# Patient Record
Sex: FEMALE | Race: WHITE | NOT HISPANIC OR LATINO
[De-identification: names, ages, dates, MRNs, and addresses within clinical notes are randomized per-mention and may not be internally consistent; named-entity substitution may affect disease eponyms.]

---

## 2018-05-06 ENCOUNTER — TRANSCRIPTION ENCOUNTER (OUTPATIENT)
Age: 48
End: 2018-05-06

## 2018-05-25 ENCOUNTER — TRANSCRIPTION ENCOUNTER (OUTPATIENT)
Age: 48
End: 2018-05-25

## 2019-02-20 ENCOUNTER — TRANSCRIPTION ENCOUNTER (OUTPATIENT)
Age: 49
End: 2019-02-20

## 2021-05-01 ENCOUNTER — TRANSCRIPTION ENCOUNTER (OUTPATIENT)
Age: 51
End: 2021-05-01

## 2021-07-30 ENCOUNTER — TRANSCRIPTION ENCOUNTER (OUTPATIENT)
Age: 51
End: 2021-07-30

## 2022-12-28 ENCOUNTER — OFFICE (OUTPATIENT)
Dept: URBAN - METROPOLITAN AREA CLINIC 35 | Facility: CLINIC | Age: 52
Setting detail: OPHTHALMOLOGY
End: 2022-12-28
Payer: MEDICAID

## 2022-12-28 DIAGNOSIS — G43.009: ICD-10-CM

## 2022-12-28 DIAGNOSIS — H35.372: ICD-10-CM

## 2022-12-28 DIAGNOSIS — F34.1: ICD-10-CM

## 2022-12-28 DIAGNOSIS — H40.013: ICD-10-CM

## 2022-12-28 DIAGNOSIS — H53.122: ICD-10-CM

## 2022-12-28 PROBLEM — H01.001 BLEPHARITIS; RIGHT UPPER LID, LEFT UPPER LID: Status: ACTIVE | Noted: 2022-12-28

## 2022-12-28 PROBLEM — H01.004 BLEPHARITIS; RIGHT UPPER LID, LEFT UPPER LID: Status: ACTIVE | Noted: 2022-12-28

## 2022-12-28 PROBLEM — H25.13 CATARACT SENILE NUCLEAR SCLEROSIS; BOTH EYES: Status: ACTIVE | Noted: 2022-12-28

## 2022-12-28 PROCEDURE — 76514 ECHO EXAM OF EYE THICKNESS: CPT | Performed by: OPHTHALMOLOGY

## 2022-12-28 PROCEDURE — 92004 COMPRE OPH EXAM NEW PT 1/>: CPT | Performed by: OPHTHALMOLOGY

## 2022-12-28 PROCEDURE — 92083 EXTENDED VISUAL FIELD XM: CPT | Performed by: OPHTHALMOLOGY

## 2022-12-28 PROCEDURE — 92250 FUNDUS PHOTOGRAPHY W/I&R: CPT | Performed by: OPHTHALMOLOGY

## 2022-12-28 PROCEDURE — 92134 CPTRZ OPH DX IMG PST SGM RTA: CPT | Performed by: OPHTHALMOLOGY

## 2022-12-28 ASSESSMENT — REFRACTION_AUTOREFRACTION
OD_CYLINDER: -0.50
OS_CYLINDER: -0.75
OD_SPHERE: -5.00
OS_SPHERE: -5.50
OD_AXIS: 108
OS_AXIS: 083

## 2022-12-28 ASSESSMENT — REFRACTION_CURRENTRX
OS_VPRISM_DIRECTION: BF
OD_ADD: +1.50
OS_OVR_VA: 20/
OD_SPHERE: -5.50
OD_AXIS: 011
OD_VPRISM_DIRECTION: BF
OS_CYLINDER: +0.50
OS_AXIS: 160
OD_CYLINDER: +0.50
OS_ADD: +1.50
OD_OVR_VA: 20/
OS_SPHERE: -5.25

## 2022-12-28 ASSESSMENT — SPHEQUIV_DERIVED
OD_SPHEQUIV: -5.25
OS_SPHEQUIV: -5.875

## 2022-12-28 ASSESSMENT — VISUAL ACUITY
OS_BCVA: 20/25-3
OD_BCVA: 20/25-2

## 2022-12-28 ASSESSMENT — CONFRONTATIONAL VISUAL FIELD TEST (CVF)
OS_FINDINGS: FULL
OD_FINDINGS: FULL

## 2022-12-28 ASSESSMENT — PACHYMETRY
OD_CT_CORRECTION: -4
OS_CT_CORRECTION: -6
OD_CT_UM: 604
OS_CT_UM: 622

## 2022-12-28 ASSESSMENT — LID EXAM ASSESSMENTS
OD_BLEPHARITIS: RUL
OS_BLEPHARITIS: LUL

## 2023-01-25 PROBLEM — Z00.00 ENCOUNTER FOR PREVENTIVE HEALTH EXAMINATION: Status: ACTIVE | Noted: 2023-01-25

## 2023-02-01 ENCOUNTER — NON-APPOINTMENT (OUTPATIENT)
Age: 53
End: 2023-02-01

## 2023-02-01 ENCOUNTER — APPOINTMENT (OUTPATIENT)
Dept: OPHTHALMOLOGY | Facility: CLINIC | Age: 53
End: 2023-02-01
Payer: MEDICAID

## 2023-02-01 PROCEDURE — 92083 EXTENDED VISUAL FIELD XM: CPT

## 2023-02-01 PROCEDURE — 99204 OFFICE O/P NEW MOD 45 MIN: CPT

## 2023-02-07 ENCOUNTER — APPOINTMENT (OUTPATIENT)
Dept: NEUROLOGY | Facility: CLINIC | Age: 53
End: 2023-02-07

## 2023-03-15 ENCOUNTER — APPOINTMENT (OUTPATIENT)
Dept: NEUROLOGY | Facility: CLINIC | Age: 53
End: 2023-03-15
Payer: MEDICAID

## 2023-03-15 VITALS
HEIGHT: 63 IN | OXYGEN SATURATION: 99 % | WEIGHT: 122 LBS | HEART RATE: 80 BPM | DIASTOLIC BLOOD PRESSURE: 80 MMHG | BODY MASS INDEX: 21.62 KG/M2 | SYSTOLIC BLOOD PRESSURE: 124 MMHG

## 2023-03-15 DIAGNOSIS — Z83.518 FAMILY HISTORY OF OTHER SPECIFIED EYE DISORDER: ICD-10-CM

## 2023-03-15 DIAGNOSIS — F31.9 BIPOLAR DISORDER, UNSPECIFIED: ICD-10-CM

## 2023-03-15 DIAGNOSIS — Z82.49 FAMILY HISTORY OF ISCHEMIC HEART DISEASE AND OTHER DISEASES OF THE CIRCULATORY SYSTEM: ICD-10-CM

## 2023-03-15 DIAGNOSIS — Z87.891 PERSONAL HISTORY OF NICOTINE DEPENDENCE: ICD-10-CM

## 2023-03-15 DIAGNOSIS — E03.9 HYPOTHYROIDISM, UNSPECIFIED: ICD-10-CM

## 2023-03-15 PROCEDURE — 99205 OFFICE O/P NEW HI 60 MIN: CPT

## 2023-03-15 RX ORDER — BUPROPION HYDROCHLORIDE 75 MG/1
TABLET, FILM COATED ORAL
Refills: 0 | Status: ACTIVE | COMMUNITY

## 2023-03-15 RX ORDER — DIAZEPAM 5 MG/1
TABLET ORAL
Refills: 0 | Status: ACTIVE | COMMUNITY

## 2023-03-15 RX ORDER — LITHIUM CARBONATE 300 MG/1
TABLET ORAL
Refills: 0 | Status: ACTIVE | COMMUNITY

## 2023-03-15 RX ORDER — LAMOTRIGINE 150 MG/1
TABLET ORAL
Refills: 0 | Status: ACTIVE | COMMUNITY

## 2023-03-15 RX ORDER — LEVOTHYROXINE SODIUM 0.17 MG/1
TABLET ORAL
Refills: 0 | Status: ACTIVE | COMMUNITY

## 2023-03-24 LAB
25(OH)D3 SERPL-MCNC: 55.6 NG/ML
CRP SERPL-MCNC: <3 MG/L
ERYTHROCYTE [SEDIMENTATION RATE] IN BLOOD BY WESTERGREN METHOD: 2 MM/HR
RHEUMATOID FACT SER QL: <10 IU/ML
TSH SERPL-ACNC: 2.22 UIU/ML
VIT B12 SERPL-MCNC: >2000 PG/ML

## 2023-03-27 LAB
ACE BLD-CCNC: 18 U/L
ANA SER IF-ACNC: NEGATIVE
B BURGDOR AB SER-IMP: NEGATIVE
B BURGDOR IGG+IGM SER QL: 0.12 INDEX
CARDIOLIPIN IGM SER-MCNC: 9 MPL
CARDIOLIPIN IGM SER-MCNC: <5 GPL
DSDNA AB SER-ACNC: 12 IU/ML
ENA RNP AB SER IA-ACNC: <0.2 AL
ENA SM AB SER IA-ACNC: <0.2 AL
ENA SS-A AB SER IA-ACNC: <0.2 AL
ENA SS-B AB SER IA-ACNC: <0.2 AL
T PALLIDUM AB SER QL IA: NEGATIVE

## 2023-03-28 ENCOUNTER — OUTPATIENT (OUTPATIENT)
Dept: OUTPATIENT SERVICES | Facility: HOSPITAL | Age: 53
LOS: 1 days | End: 2023-03-28
Payer: MEDICAID

## 2023-03-28 ENCOUNTER — APPOINTMENT (OUTPATIENT)
Dept: RADIOLOGY | Facility: HOSPITAL | Age: 53
End: 2023-03-28

## 2023-03-28 ENCOUNTER — RESULT REVIEW (OUTPATIENT)
Age: 53
End: 2023-03-28

## 2023-03-28 DIAGNOSIS — R90.89 OTHER ABNORMAL FINDINGS ON DIAGNOSTIC IMAGING OF CENTRAL NERVOUS SYSTEM: ICD-10-CM

## 2023-03-28 LAB
APPEARANCE CSF: CLEAR — SIGNIFICANT CHANGE UP
B2 GLYCOPROT1 IGG SER-ACNC: <5 SGU
B2 GLYCOPROT1 IGM SER-ACNC: 7.3 SMU
COLOR CSF: SIGNIFICANT CHANGE UP
GLUCOSE CSF-MCNC: 60 MG/DL — SIGNIFICANT CHANGE UP (ref 40–70)
GRAM STN FLD: SIGNIFICANT CHANGE UP
LDH CSF L TO P-CCNC: 18 U/L — SIGNIFICANT CHANGE UP
LDH FLD-CCNC: 18 U/L — SIGNIFICANT CHANGE UP
LYMPHOCYTES # CSF: 92 % — HIGH (ref 40–80)
MONOS+MACROS NFR CSF: 8 % — LOW (ref 15–45)
NEUTROPHILS # CSF: SIGNIFICANT CHANGE UP (ref 0–6)
NRBC NFR CSF: 2 /UL — SIGNIFICANT CHANGE UP (ref 0–5)
PROT CSF-MCNC: 38 MG/DL — SIGNIFICANT CHANGE UP (ref 15–45)
RBC # CSF: 0 /UL — SIGNIFICANT CHANGE UP (ref 0–0)
SPECIMEN SOURCE: SIGNIFICANT CHANGE UP
TUBE TYPE: SIGNIFICANT CHANGE UP

## 2023-03-28 PROCEDURE — 88108 CYTOPATH CONCENTRATE TECH: CPT | Mod: 26

## 2023-03-28 PROCEDURE — 83873 ASSAY OF CSF PROTEIN: CPT

## 2023-03-28 PROCEDURE — 88189 FLOWCYTOMETRY/READ 16 & >: CPT

## 2023-03-28 PROCEDURE — 82042 OTHER SOURCE ALBUMIN QUAN EA: CPT

## 2023-03-28 PROCEDURE — 87070 CULTURE OTHR SPECIMN AEROBIC: CPT

## 2023-03-28 PROCEDURE — 86789 WEST NILE VIRUS ANTIBODY: CPT

## 2023-03-28 PROCEDURE — 86788 WEST NILE VIRUS AB IGM: CPT

## 2023-03-28 PROCEDURE — 82784 ASSAY IGA/IGD/IGG/IGM EACH: CPT

## 2023-03-28 PROCEDURE — 88185 FLOWCYTOMETRY/TC ADD-ON: CPT

## 2023-03-28 PROCEDURE — 84157 ASSAY OF PROTEIN OTHER: CPT

## 2023-03-28 PROCEDURE — 86592 SYPHILIS TEST NON-TREP QUAL: CPT

## 2023-03-28 PROCEDURE — 62328 DX LMBR SPI PNXR W/FLUOR/CT: CPT

## 2023-03-28 PROCEDURE — 88108 CYTOPATH CONCENTRATE TECH: CPT

## 2023-03-28 PROCEDURE — 83615 LACTATE (LD) (LDH) ENZYME: CPT

## 2023-03-28 PROCEDURE — 82945 GLUCOSE OTHER FLUID: CPT

## 2023-03-28 PROCEDURE — 86617 LYME DISEASE ANTIBODY: CPT

## 2023-03-28 PROCEDURE — 89051 BODY FLUID CELL COUNT: CPT

## 2023-03-28 PROCEDURE — 83916 OLIGOCLONAL BANDS: CPT

## 2023-03-28 PROCEDURE — 87205 SMEAR GRAM STAIN: CPT

## 2023-03-28 PROCEDURE — 82164 ANGIOTENSIN I ENZYME TEST: CPT

## 2023-03-28 PROCEDURE — 82040 ASSAY OF SERUM ALBUMIN: CPT

## 2023-03-29 LAB
ALBUMIN CSF-MCNC: 24.9 MG/DL — SIGNIFICANT CHANGE UP (ref 14–25)
ALBUMIN SERPL ELPH-MCNC: 3783 MG/DL — SIGNIFICANT CHANGE UP (ref 3500–5200)
IGG CSF-MCNC: 5.7 MG/DL — HIGH
IGG CSF-MCNC: 5.7 MG/DL — HIGH
IGG FLD-MCNC: 1045 MG/DL — SIGNIFICANT CHANGE UP (ref 610–1660)
IGG SYNTH RATE SER+CSF CALC-MRATE: 9.3 MG/DAY — HIGH
IGG/ALB CLEAR SER+CSF-RTO: 0.8 — HIGH
IGG/ALB CSF: 0.23 RATIO — SIGNIFICANT CHANGE UP
IGG/ALB SER: 0.28 RATIO — SIGNIFICANT CHANGE UP

## 2023-03-30 LAB
AQP4 H2O CHANNEL AB SERPL IA-ACNC: NEGATIVE
HTLV I+II AB SER QL: NORMAL
MOG AB SER QL CBA IFA: NEGATIVE
NON-GYNECOLOGICAL CYTOLOGY STUDY: SIGNIFICANT CHANGE UP
TM INTERPRETATION: SIGNIFICANT CHANGE UP
VDRL CSF-TITR: SIGNIFICANT CHANGE UP

## 2023-03-30 NOTE — HISTORY OF PRESENT ILLNESS
[FreeTextEntry1] : HPI (initial visit Mar 15, 2023)- SAMUEL LIM is a 52 year old woman w/ hx of bipolar disorder, depression, hypothyroidism, fibromyalgia, migraine headaches, referred by neurologist, Dr. Melvin Guo, to rule out MS. \par \par Of note, she was seen by Dr. Corbett (2/1/2023) for episodic vision loss OS. First episode was 5/2022, lost complete vision in left eye, "like a curtain", it lasted seconds but vision still remained blurry (both eyes) for another 30 min and also complained of pain with eye movements of left eye. Saw an Optometrist, thought it was related to contact lenses, given new contacts. Occurs 2-3 times/month (duration of episodes varies). No clear triggers. WIth last 2 episodes had double vision, never covered on eye to see if it improved. Neuro ophtho exam was normal and Dr. Corbett suspect aura migraines, given childhood hx of migraines. Recommended neurology evaluation. Has had carotid duplex which was reportedly normal.\par \par She keeps a note of all her symptoms. She has had multiple symptoms over the years. "I have had things over my life that have never been put together". "Extreme fatigue". "severe memory fog". Word finding difficulty- ~ 2020. Chest tightness. Chronic bowel issues- constipation, seeing GI recently and had colonoscopy, on MiraLAX, it is working. Mother has always told her she walks funny and slurs her words.  notices her balance is off, improving with PT. Looses her balance if she squats, this started about 7 years ago. She was diagnosed with fibromyalgia, knee pains/shoulder pains/ankle pain, seen rheumatologist, Isolated AMARI elevation, "flare ups now and then". Several incidents of vertigo, lasting 3-5 min, now resolved. Stiffness in neck/shoulders and other joints. She gets tension headaches. Urinary urgency and hesitancy. \par \par Recently seen by Dr Melvin Guo (neurologist), had MRI brain and spine at [facility] 3T MRI (pt brings reports and disc). I personally reviewed images. \par MRI brain w/o con 213/2023- scattered supratentorial WM lesions, more in R cerebral hemisphere, some punctuate and others ovoid appearing and periventricular (abutting Lat ventricle). No definite CC or infratentorial lesions. no enhancing lesions. The larger WM lesions have corresponding T1 black holes on T1 sequence. \par MRI brain and orbits w/w/o 3/2/2023- Stable brain MRI. Normal orbits. \par MRI C w/w/o 2/23/2023- no cervical cord lesion. Mild posterior disc bulging at several levels with mild thecal sac compression and b/l NF narrowing at C4-C5.\par MRI T spine w/w/o 2/24/2023- abnormal signal at anterior left T2 cord without enh. Cord atrophy in this region. \par \par She got  in July 2022. Diagnosed with Bipolar disorder 12 years ago (sees a psychopharmacologist), reports mood is well controlled. She was a  for 27 years, stopped working in 2021 due to brain fog.\par \par

## 2023-03-30 NOTE — DATA REVIEWED
[de-identified] : Recently seen by Dr Melvin Guo (neurologist), had MRI brain and spine at [facility] 3T MRI (pt brings reports and disc). I personally reviewed images. \par MRI brain w/o con 213/2023- scattered supratentorial WM lesions, more in R cerebral hemisphere, some punctuate and others ovoid appearing and periventricular (abutting Lat ventricle). No definite CC or infratentorial lesions. no enhancing lesions. The larger WM lesions have corresponding T1 black holes on T1 sequence. \par MRI brain and orbits w/w/o 3/2/2023- Stable brain MRI. Normal orbits. \par MRI C w/w/o 2/23/2023- no cervical cord lesion. Mild posterior disc bulging at several levels with mild thecal sac compression and b/l NF narrowing at C4-C5.\par MRI T spine w/w/o 2/24/2023- abnormal signal at anterior left T2 cord without enh. Cord atrophy in this region.

## 2023-03-30 NOTE — ASSESSMENT
[FreeTextEntry1] : Assessment/Plan:\par  52 year old female referred by Dr Melvin Guo for consultation on possible MS. She has had a long standing hx of non specific symptoms, including imbalance, slurred speech, vertigo, brain fog with word finding difficulty, generalized joint pains and extreme fatigue, which have all lead her to quit her job in 2021. Since May 2022, she has been experiencing intermittent transient monocular/binocular visual disturbances (vision loss/blurry vision/diplopia) with normal neuro ophthalmological exam by Dr Corbett 2/2023. She recently had MRI imaging of brain and spine which showed lesions on brain MRI and T spine MRI concerning for possible demyelinating disease.\par \par Though her MRI scans are concerning for a demyelinating process and her neurological exam is also significant for upper motor neuron signs, her clinical history is non specific and not strongly suggestive of MS. She does warrant further work up to identify cause of brain and cord lesions. \par \par Plan:-\par [] Will order MS mimickers in serum\par [] Recommend spinal tap to look for oligoclonal bands\par Discussed the procedure and side effects with patients; including infection, bleeding, nerve injury and post spinal headache.\par [] Will uploaded MRI imaging for further review\par \par \par Return to clinic 1 month\par \par The above plan was discussed with SAMUEL LIM in great detail.  SAMUEL LIM verbalized understanding and agrees with plan as detailed above. Patient was provided education and counselling on current diagnosis/symptoms. She was advised to call our clinic at [phone] for any new or worsening symptoms, or with any questions or concerns. In case of acute onset of neurological symptoms or worsening presentation, patient was advised to present to nearest emergency room for further evaluation. SAMUEL LIM expressed understanding and all her questions/concerns were addressed.\par \par Lashonda Alvarado M.D\par

## 2023-03-30 NOTE — PHYSICAL EXAM
[FreeTextEntry1] : PHYSICAL EXAM\par Constitutional: Alert, no acute distress \par Psychiatric: appropriate affect and mood\par Pulmonary: No respiratory distress, stable on room air\par \par NEUROLOGICAL EXAM\par Mental status: The patient is alert, attentive and oriented x 4\par Speech/language: No dysarthria. Slow speech. Pauses. Slowed processing speed when comprehending commands. \par Cranial nerves:\par CN II: Visual fields are full to confrontation. Pupil size equal and briskly reactive to light. No RAPD\par CN III, IV, VI: EOMI, b/l end gaze nystagmus, no ptosis\par CN V: Reduced sensations to PP over right half of face with midline splitting\par CN VII: Face is symmetric with normal eye closure and smile.\par CN VII: Hearing is normal to rubbing fingers\par CN IX, X: Palate elevates symmetrically. Phonation is normal.\par CN XI: Head turning and shoulder shrug are intact\par CN XII: Tongue is midline with normal movements and no atrophy.\par Motor: Strength is full bilaterally. 5/5 muscle power in bilateral UE and LE. Some give way weakness. Muscle tone increased in LLE.\par Reflexes:  Diffusely hyperreflexic, b/l yeboah, b/l clonus (nonsustained on L and sustained on R). No Babinski\par Sensory: Reduced sensation to PP over RUE and LLE (midline splitting over chest). Vibration sensation stronger over RUE (25 s) and RLE (10 s) compared to LUE (21s) and LLE (8s) respectively. \par Coordination: Tremors of hands- distractible. No dysmetria on FNF\par Gait/Stance: Narrow based spastic gait. Cannot tandem gait. Romberg positive\par \par \par \par \par

## 2023-03-31 LAB
INNER EAR 68KD AB FLD QL: <1.5 U/L — SIGNIFICANT CHANGE UP (ref 0–2.5)
WNV IGG CSF IA-ACNC: NEGATIVE — SIGNIFICANT CHANGE UP
WNV IGM CSF IA-ACNC: NEGATIVE — SIGNIFICANT CHANGE UP

## 2023-04-01 LAB
CULTURE RESULTS: NO GROWTH — SIGNIFICANT CHANGE UP
SPECIMEN SOURCE: SIGNIFICANT CHANGE UP

## 2023-04-02 LAB — MBP CSF-MCNC: 5.2 NG/ML — HIGH (ref 0–3.7)

## 2023-04-06 LAB — OLIGOCLONAL BANDS CSF ELPH-IMP: SIGNIFICANT CHANGE UP

## 2023-04-10 LAB — B BURGDOR AB CSF-ACNC: SIGNIFICANT CHANGE UP

## 2023-04-12 ENCOUNTER — APPOINTMENT (OUTPATIENT)
Dept: NEUROLOGY | Facility: CLINIC | Age: 53
End: 2023-04-12
Payer: MEDICAID

## 2023-04-12 VITALS
WEIGHT: 136 LBS | OXYGEN SATURATION: 100 % | RESPIRATION RATE: 15 BRPM | DIASTOLIC BLOOD PRESSURE: 69 MMHG | HEART RATE: 74 BPM | SYSTOLIC BLOOD PRESSURE: 109 MMHG | BODY MASS INDEX: 24.1 KG/M2 | TEMPERATURE: 98 F | HEIGHT: 63 IN

## 2023-04-12 DIAGNOSIS — H53.9 UNSPECIFIED VISUAL DISTURBANCE: ICD-10-CM

## 2023-04-12 DIAGNOSIS — R90.89 OTHER ABNORMAL FINDINGS ON DIAGNOSTIC IMAGING OF CENTRAL NERVOUS SYSTEM: ICD-10-CM

## 2023-04-12 DIAGNOSIS — R93.7 ABNORMAL FINDINGS ON DIAGNOSTIC IMAGING OF OTHER PARTS OF MUSCULOSKELETAL SYSTEM: ICD-10-CM

## 2023-04-12 PROCEDURE — 99214 OFFICE O/P EST MOD 30 MIN: CPT

## 2023-04-13 LAB
ALBUMIN SERPL ELPH-MCNC: 4.9 G/DL
ALP BLD-CCNC: 113 U/L
ALT SERPL-CCNC: 22 U/L
AST SERPL-CCNC: 16 U/L
BASOPHILS # BLD AUTO: 0.06 K/UL
BASOPHILS NFR BLD AUTO: 0.8 %
BILIRUB DIRECT SERPL-MCNC: 0.1 MG/DL
BILIRUB INDIRECT SERPL-MCNC: 0.2 MG/DL
BILIRUB SERPL-MCNC: 0.4 MG/DL
BUN SERPL-MCNC: 13 MG/DL
CREAT SERPL-MCNC: 0.93 MG/DL
DEPRECATED KAPPA LC FREE/LAMBDA SER: 1.29 RATIO
EGFR: 75 ML/MIN/1.73M2
EOSINOPHIL # BLD AUTO: 0.41 K/UL
EOSINOPHIL NFR BLD AUTO: 5.6 %
HBV CORE IGG+IGM SER QL: NONREACTIVE
HBV CORE IGM SER QL: NONREACTIVE
HBV SURFACE AB SER QL: NONREACTIVE
HBV SURFACE AG SER QL: NONREACTIVE
HCT VFR BLD CALC: 36.2 %
HGB BLD-MCNC: 11.3 G/DL
IGA SER QL IEP: 145 MG/DL
IGG SER QL IEP: 1086 MG/DL
IGM SER QL IEP: 168 MG/DL
IMM GRANULOCYTES NFR BLD AUTO: 0.3 %
KAPPA LC CSF-MCNC: 2.48 MG/DL
KAPPA LC SERPL-MCNC: 3.2 MG/DL
LYMPHOCYTES # BLD AUTO: 2.27 K/UL
LYMPHOCYTES NFR BLD AUTO: 31.2 %
MAN DIFF?: NORMAL
MCHC RBC-ENTMCNC: 31.2 GM/DL
MCHC RBC-ENTMCNC: 31.2 PG
MCV RBC AUTO: 100 FL
MONOCYTES # BLD AUTO: 0.42 K/UL
MONOCYTES NFR BLD AUTO: 5.8 %
NEUTROPHILS # BLD AUTO: 4.1 K/UL
NEUTROPHILS NFR BLD AUTO: 56.3 %
PLATELET # BLD AUTO: 318 K/UL
PROT SERPL-MCNC: 6.9 G/DL
RBC # BLD: 3.62 M/UL
RBC # FLD: 13.5 %
WBC # FLD AUTO: 7.28 K/UL

## 2023-04-14 NOTE — DATA REVIEWED
[de-identified] : Recently seen by Dr Melvin Guo (neurologist), had MRI brain and spine at [facility] 3T MRI (pt brings reports and disc). I personally reviewed images. \par MRI brain w/o con 213/2023- scattered supratentorial WM lesions, more in R cerebral hemisphere, some punctuate and others ovoid appearing and periventricular (abutting Lat ventricle). No definite CC or infratentorial lesions. no enhancing lesions. The larger WM lesions have corresponding T1 black holes on T1 sequence. \par MRI brain and orbits w/w/o 3/2/2023- Stable brain MRI. Normal orbits. \par MRI C w/w/o 2/23/2023- no cervical cord lesion. Mild posterior disc bulging at several levels with mild thecal sac compression and b/l NF narrowing at C4-C5.\par MRI T spine w/w/o 2/24/2023- abnormal signal at anterior left T2 cord without enh. Cord atrophy in this region.  [de-identified] : Spinal tap 3/28- Uinque and matched OCB (> 5 unique), 2 TNC, Lyme neg, ACE neg, VDRL neg, WNV neg. P38, G60. IgG index 0.8, MBP 5.2. \par MS mimicker labs in serum, including NMO and MOG ab negative. \par Vitamin D 55.6. B12 > 2000.

## 2023-04-14 NOTE — HISTORY OF PRESENT ILLNESS
[FreeTextEntry1] : INTERIM HX 04/12/2023: \par Spinal tap 3/28- Uinque and matched OCB (> 5 unique), 2 TNC, Lyme neg, ACE neg, VDRL neg, WNV neg. P38, G60. IgG index 0.8, MBP 5.2. \par MS mimicker labs in serum, including NMO and MOG ab negative. \par Vitamin D 55.6. B12 > 2000.\par \par Had post spinal headaches x 3 days, self resolved. \par no more visual episodes/\par Extreme fatigue.\par Tension headaches 3-4x/month, does not recall last migraine HA.\par Financial stress and worry.\par Tremors of hands. \par Also lived a very busy life, may have brushed off symptoms. When she first started her job after college, she would notice RLE numbness, attributed this to sitting long periods of time. \par Cannot remember things, word finding.\par A Pediatrician had recommended she be watched for "MS".\par ----------------------------------\par HPI (initial visit Mar 15, 2023)- SAMUEL LIM is a 52 year old woman w/ hx of bipolar disorder, depression, hypothyroidism, fibromyalgia, migraine headaches, referred by neurologist, Dr. Melvin Guo, to rule out MS. \par \par Of note, she was seen by Dr. Corbett (2/1/2023) for episodic vision loss OS. First episode was 5/2022, lost complete vision in left eye, "like a curtain", it lasted seconds but vision still remained blurry (both eyes) for another 30 min and also complained of pain with eye movements of left eye. Saw an Optometrist, thought it was related to contact lenses, given new contacts. Occurs 2-3 times/month (duration of episodes varies). No clear triggers. WIth last 2 episodes had double vision, never covered on eye to see if it improved. Neuro ophtho exam was normal and Dr. Corbett suspect aura migraines, given childhood hx of migraines. Recommended neurology evaluation. Has had carotid duplex which was reportedly normal.\par \par She keeps a note of all her symptoms. She has had multiple symptoms over the years. "I have had things over my life that have never been put together". "Extreme fatigue". "severe memory fog". Word finding difficulty- ~ 2020. Chest tightness. Chronic bowel issues- constipation, seeing GI recently and had colonoscopy, on MiraLAX, it is working. Mother has always told her she walks funny and slurs her words.  notices her balance is off, improving with PT. Looses her balance if she squats, this started about 7 years ago. She was diagnosed with fibromyalgia, knee pains/shoulder pains/ankle pain, seen rheumatologist, Isolated AMARI elevation, "flare ups now and then". Several incidents of vertigo, lasting 3-5 min, now resolved. Stiffness in neck/shoulders and other joints. She gets tension headaches. Urinary urgency and hesitancy. \par \par Recently seen by Dr Melvin Guo (neurologist), had MRI brain and spine at [facility] 3T MRI (pt brings reports and disc). I personally reviewed images. \par MRI brain w/o con 213/2023- scattered supratentorial WM lesions, more in R cerebral hemisphere, some punctuate and others ovoid appearing and periventricular (abutting Lat ventricle). No definite CC or infratentorial lesions. no enhancing lesions. The larger WM lesions have corresponding T1 black holes on T1 sequence. \par MRI brain and orbits w/w/o 3/2/2023- Stable brain MRI. Normal orbits. \par MRI C w/w/o 2/23/2023- no cervical cord lesion. Mild posterior disc bulging at several levels with mild thecal sac compression and b/l NF narrowing at C4-C5.\par MRI T spine w/w/o 2/24/2023- abnormal signal at anterior left T2 cord without enh. Cord atrophy in this region. \par \par She got  in July 2022. Diagnosed with Bipolar disorder 12 years ago (sees a psychopharmacologist), reports mood is well controlled. She was a  for 27 years, stopped working in 2021 due to brain fog.\par \par

## 2023-04-14 NOTE — PHYSICAL EXAM
[FreeTextEntry1] : PHYSICAL EXAM\par Constitutional: Alert, no acute distress \par Psychiatric: appropriate affect and mood\par Pulmonary: No respiratory distress, stable on room air\par \par NEUROLOGICAL EXAM\par Mental status: The patient is alert, attentive and oriented x 4\par Speech/language: No dysarthria. Slow speech. Pauses. Slowed processing speed when comprehending commands. \par Cranial nerves:\par CN II: Visual fields are full to confrontation. Pupil size equal and briskly reactive to light. No RAPD\par CN III, IV, VI: EOMI, b/l end gaze nystagmus, no ptosis\par CN V: Reduced sensations to PP over right half of face with midline splitting\par CN VII: Face is symmetric with normal eye closure and smile.\par CN VII: Hearing is normal to rubbing fingers\par CN IX, X: Palate elevates symmetrically. Phonation is normal.\par CN XI: Head turning and shoulder shrug are intact\par CN XII: Tongue is midline with normal movements and no atrophy.\par Motor: Strength is full bilaterally. 5/5 muscle power in bilateral UE and LE. Some give way weakness. Muscle tone increased in LLE.\par Reflexes: Diffusely hyperreflexic, b/l yeboah, b/l clonus (nonsustained on L and sustained on R). No Babinski\par Sensory: Reduced sensation to PP over RUE and LLE (midline splitting over chest). Vibration sensation stronger over RUE (25 s) and RLE (10 s) compared to LUE (21s) and LLE (8s) respectively. \par Coordination: Tremors of hands- distractible. No dysmetria on FNF\par Gait/Stance: Narrow based spastic gait. Cannot tandem gait. Romberg positive

## 2023-04-17 LAB
M TB IFN-G BLD-IMP: NEGATIVE
QUANTIFERON TB PLUS MITOGEN MINUS NIL: 6.21 IU/ML
QUANTIFERON TB PLUS NIL: 0.02 IU/ML
QUANTIFERON TB PLUS TB1 MINUS NIL: -0.01 IU/ML
QUANTIFERON TB PLUS TB2 MINUS NIL: -0.01 IU/ML
VZV AB TITR SER: POSITIVE
VZV IGG SER IF-ACNC: >4000 INDEX
VZV IGM SER IF-ACNC: <0.91 INDEX

## 2023-04-20 LAB
JCV INDEX: 0.13
STRATIFY JCV ANTIBODY: NEGATIVE

## 2023-04-26 ENCOUNTER — APPOINTMENT (OUTPATIENT)
Dept: NEUROLOGY | Facility: CLINIC | Age: 53
End: 2023-04-26
Payer: MEDICAID

## 2023-04-26 VITALS
TEMPERATURE: 98 F | HEIGHT: 63 IN | HEART RATE: 82 BPM | RESPIRATION RATE: 16 BRPM | WEIGHT: 143 LBS | SYSTOLIC BLOOD PRESSURE: 155 MMHG | OXYGEN SATURATION: 100 % | BODY MASS INDEX: 25.34 KG/M2 | DIASTOLIC BLOOD PRESSURE: 92 MMHG

## 2023-04-26 PROCEDURE — 99214 OFFICE O/P EST MOD 30 MIN: CPT

## 2023-04-26 NOTE — ASSESSMENT
[FreeTextEntry1] : Assessment/Plan:\par 52 year old female referred by Dr Melvin Guo for consultation on possible MS. She has had a long standing hx of non specific symptoms, including imbalance, slurred speech, vertigo, brain fog with word finding difficulty, generalized joint pains and extreme fatigue, which have all led her to quit her job in 2021. Since May 2022, she has been experiencing intermittent transient monocular/binocular visual disturbances (vision loss/blurry vision/diplopia) with normal neuro ophthalmological exam by Dr Corbett 2/2023. She recently had MRI imaging of brain and spine which showed lesions on brain MRI and T spine MRI concerning for possible demyelinating disease with neurological exam also significant for upper motor neuron signs and spinal tap shows both unique and matched OCB.\par \par Her clinical history is non specific, no clear hx of "relapses", and a lot of her symptoms could be attributed to other conditions; for example the episodic visual disturbances could be aura migraines, the extreme fatigue/brain fog/cognitive issues could be related to mood disorder/bipolar disorder +/- on going psychosocial stressors (psychosomatic) , however at the same time given her objective findings on neurological exam (increased muscle tone in LLE, sustained clonus RLE) cannot definitely rule out a clinical CNS demyelinating event that occurred in the past which she either cannot recall or may have brushed off. \par \par At this time, with her MRI, CSF results, clinical hx and neurological exam I do favor a diagnosis of CNS demyelinating disease, most fitting with Multiple sclerosis.\par \par I discussed the pathophysiology of Multiple Sclerosis, its disease course and management. We discussed the different options for disease modifying therapy. I explained to her that the goal of treatment is to prevent any new clinical relapses, new lesions on MRI scans and to slow down disease progression/disability. In addition to discussing disease modifying therapies, we reviewed available therapies for symptomatic management for spasticity, neuropathic pain, bladder symptoms, fatigue etc. I also stressed on the importance of healthy eating habits, routine physical therapy and vitamin D supplementation.\par \par \par Plan:-\par 1. Diagnostic Plan/Imaging: Plan to repeat MRI brain, C/T spine w/w/o contrast 3 months after start of DMT. \par \par 2. Disease Modifying therapy plan:\par I recommend Vumerity (start form signed):\par CBC and LFT prior to initiation of therapy, then every 6 months.\par Starting dose(capsules):  231 mg BID for 7 days followed by 462 mg  (2 capsules) BID (maintenance dose). \par May take Vumerity with or without food\par Discussed common adverse reactions: Flushing, abdominal pain, diarrhea, and nausea. Patient can take non-enteric coated aspirin 30 minutes prior to dose to minimize flushing.\par Informed patient that Vumerity can cause decrease in lymphocyte counts and cause liver injury. \par \par 3. Symptomatic therapy plan:\par () Fatigue: Will continue to monitor. Discussed non pharmacological measures for addressing fatigue. Recommend focusing on mental health, sleep, diet and exercise. \par () Spasticity: Stretching exercises\par () Neuropathic pain: Will continue to monitor\par () Bladder Dysfunction: Will refer to urology\par () Mobility- Recommend physical therapy for balance training and strengthening - pt deferred. Her  is a therapist.\par () Depression/Anxiety- F/u with psychiatrist (Dr. Israel). Recommend CBT\par () Vitamin D3 and B12 supplementations \par \par 4. Migraine auras:-\par [] Continue sumatriptan PRN\par [] Over the counter preventative therapies discussed, which include Magnesium 400 mg QD (discussed potential side effect of diarrhea), riboflavin 400 mg QD and Coenzyme Q10 100 mg daily.\par \par \par Return to clinic 3 months\par \par The above plan was discussed with SAMUEL LIM in great detail.  SAMUEL LIM verbalized understanding and agrees with plan as detailed above. Patient was provided education and counselling on current diagnosis/symptoms. She was advised to call our clinic at [phone] for any new or worsening symptoms, or with any questions or concerns. In case of acute onset of neurological symptoms or worsening presentation, patient was advised to present to nearest emergency room for further evaluation. SAMUEL LIM expressed understanding and all her questions/concerns were addressed.\par \par Lashonda Alvarado M.D\par

## 2023-04-26 NOTE — HISTORY OF PRESENT ILLNESS
[FreeTextEntry1] : INTERIM HX 04/26/2023: "I am upset and nervous". Reports she had new symptoms- woke up and could not move one day, "like rocks on me", "a fatigue I have never experienced". This lasted for a few days, stayed in PJs, felt better this morning. "My voice changes", "I get hoarse". Had another visual "aura" symptom, took sumatriptan it helped the headache, but make her tired. 2 falls, no injuries. Pt tearful and anxious during visit.\par \par INTERIM HX 04/12/2023: \par Spinal tap 3/28- Uinque and matched OCB (> 5 unique), 2 TNC, Lyme neg, ACE neg, VDRL neg, WNV neg. P38, G60. IgG index 0.8, MBP 5.2. \par MS mimicker labs in serum, including NMO and MOG ab negative. \par Vitamin D 55.6. B12 > 2000.\par \par Had post spinal headaches x 3 days, self resolved. \par no more visual episodes/\par Extreme fatigue.\par Tension headaches 3-4x/month, does not recall last migraine HA.\par Financial stress and worry.\par Tremors of hands. \par Also lived a very busy life, may have brushed off symptoms. When she first started her job after college, she would notice RLE numbness, attributed this to sitting long periods of time. \par Cannot remember things, word finding.\par A Pediatrician had recommended she be watched for "MS".\par ----------------------------------\par HPI (initial visit Mar 15, 2023)- SAMUEL LIM is a 52 year old woman w/ hx of bipolar disorder, depression, hypothyroidism, fibromyalgia, migraine headaches, referred by neurologist, Dr. Melvin Guo, to rule out MS. \par \par Of note, she was seen by Dr. Corbett (2/1/2023) for episodic vision loss OS. First episode was 5/2022, lost complete vision in left eye, "like a curtain", it lasted seconds but vision still remained blurry (both eyes) for another 30 min and also complained of pain with eye movements of left eye. Saw an Optometrist, thought it was related to contact lenses, given new contacts. Occurs 2-3 times/month (duration of episodes varies). No clear triggers. WIth last 2 episodes had double vision, never covered on eye to see if it improved. Neuro ophtho exam was normal and Dr. Corbett suspect aura migraines, given childhood hx of migraines. Recommended neurology evaluation. Has had carotid duplex which was reportedly normal.\par \par She keeps a note of all her symptoms. She has had multiple symptoms over the years. "I have had things over my life that have never been put together". "Extreme fatigue". "severe memory fog". Word finding difficulty- ~ 2020. Chest tightness. Chronic bowel issues- constipation, seeing GI recently and had colonoscopy, on MiraLAX, it is working. Mother has always told her she walks funny and slurs her words.  notices her balance is off, improving with PT. Looses her balance if she squats, this started about 7 years ago. She was diagnosed with fibromyalgia, knee pains/shoulder pains/ankle pain, seen rheumatologist, Isolated AMARI elevation, "flare ups now and then". Several incidents of vertigo, lasting 3-5 min, now resolved. Stiffness in neck/shoulders and other joints. She gets tension headaches. Urinary urgency and hesitancy. \par \par Recently seen by Dr Melvin Guo (neurologist), had MRI brain and spine at [facility] 3T MRI (pt brings reports and disc). I personally reviewed images. \par MRI brain w/o con 213/2023- scattered supratentorial WM lesions, more in R cerebral hemisphere, some punctuate and others ovoid appearing and periventricular (abutting Lat ventricle). No definite CC or infratentorial lesions. no enhancing lesions. The larger WM lesions have corresponding T1 black holes on T1 sequence. \par MRI brain and orbits w/w/o 3/2/2023- Stable brain MRI. Normal orbits. \par MRI C w/w/o 2/23/2023- no cervical cord lesion. Mild posterior disc bulging at several levels with mild thecal sac compression and b/l NF narrowing at C4-C5.\par MRI T spine w/w/o 2/24/2023- abnormal signal at anterior left T2 cord without enh. Cord atrophy in this region. \par \par She got  in July 2022. Diagnosed with Bipolar disorder 12 years ago (sees a psychopharmacologist), reports mood is well controlled. She was a  for 27 years, stopped working in 2021 due to brain fog.\par \par

## 2023-04-26 NOTE — DATA REVIEWED
[de-identified] : Recently seen by Dr Melvin Guo (neurologist), had MRI brain and spine at [facility] 3T MRI (pt brings reports and disc). I personally reviewed images. \par MRI brain w/o con 213/2023- scattered supratentorial WM lesions, more in R cerebral hemisphere, some punctuate and others ovoid appearing and periventricular (abutting Lat ventricle). No definite CC or infratentorial lesions. no enhancing lesions. The larger WM lesions have corresponding T1 black holes on T1 sequence. \par MRI brain and orbits w/w/o 3/2/2023- Stable brain MRI. Normal orbits. \par MRI C w/w/o 2/23/2023- no cervical cord lesion. Mild posterior disc bulging at several levels with mild thecal sac compression and b/l NF narrowing at C4-C5.\par MRI T spine w/w/o 2/24/2023- abnormal signal at anterior left T2 cord without enh. Cord atrophy in this region.  [de-identified] : Spinal tap 3/28- Uinque and matched OCB (> 5 unique), 2 TNC, Lyme neg, ACE neg, VDRL neg, WNV neg. P38, G60. IgG index 0.8, MBP 5.2. \par MS mimicker labs in serum, including NMO and MOG ab negative. \par Vitamin D 55.6. B12 > 2000.

## 2023-04-26 NOTE — PHYSICAL EXAM
[FreeTextEntry1] : PHYSICAL EXAM\par Constitutional: Alert, no acute distress \par Psychiatric: appropriate affect and mood\par Pulmonary: No respiratory distress, stable on room air\par \par NEUROLOGICAL EXAM\par Mental status: The patient is alert, attentive and oriented x 4\par Speech/language: No dysarthria. Slow speech. Pauses. Slowed processing speed when comprehending commands. \par Cranial nerves:\par CN II: Visual fields are full to confrontation. Pupil size equal and briskly reactive to light. No RAPD\par CN III, IV, VI: EOMI, b/l end gaze nystagmus, no ptosis\par CN V: Reduced sensations to PP over right half of face with midline splitting\par CN VII: Face is symmetric with normal eye closure and smile.\par CN VII: Hearing is normal to rubbing fingers\par CN IX, X: Palate elevates symmetrically. Phonation is normal.\par CN XI: Head turning and shoulder shrug are intact\par CN XII: Tongue is midline with normal movements and no atrophy.\par Motor: Strength is full bilaterally. 5/5 muscle power in bilateral UE and LE. Some give way weakness. Muscle tone increased in LLE.\par Reflexes: Diffusely hyperreflexic, b/l yeboah, b/l clonus (nonsustained on L and sustained on R). No Babinski\par Sensory: Reduced sensation to PP over RUE and LLE (midline splitting over chest). Vibration sensation stronger over RUE (25 s) and RLE (10 s) compared to LUE (21s) and LLE (8s) respectively. \par Coordination: Tremors of hands- distractible. No dysmetria on FNF\par Gait/Stance: Narrow based spastic gait. Cannot tandem gait. Romberg positive.

## 2023-04-28 ENCOUNTER — NON-APPOINTMENT (OUTPATIENT)
Age: 53
End: 2023-04-28

## 2023-04-28 RX ORDER — DIROXIMEL FUMARATE 231 MG/1
231 CAPSULE ORAL
Qty: 14 | Refills: 0 | Status: DISCONTINUED | COMMUNITY
Start: 2023-04-26 | End: 2023-04-28

## 2023-04-28 RX ORDER — DIROXIMEL FUMARATE 231 MG/1
231 CAPSULE ORAL
Qty: 120 | Refills: 6 | Status: DISCONTINUED | COMMUNITY
Start: 2023-04-26 | End: 2023-04-28

## 2023-05-09 ENCOUNTER — NON-APPOINTMENT (OUTPATIENT)
Age: 53
End: 2023-05-09

## 2023-05-24 ENCOUNTER — APPOINTMENT (OUTPATIENT)
Dept: NEUROLOGY | Facility: CLINIC | Age: 53
End: 2023-05-24
Payer: MEDICAID

## 2023-05-24 PROCEDURE — 99214 OFFICE O/P EST MOD 30 MIN: CPT | Mod: 95

## 2023-05-24 RX ORDER — DIMETHYL FUMARATE 120-240 MG
KIT ORAL
Qty: 60 | Refills: 0 | Status: COMPLETED | COMMUNITY
Start: 2023-04-28 | End: 2023-05-24

## 2023-05-24 RX ORDER — SUMATRIPTAN 50 MG/1
50 TABLET, FILM COATED ORAL
Qty: 8 | Refills: 3 | Status: DISCONTINUED | COMMUNITY
Start: 2023-04-12 | End: 2023-05-24

## 2023-05-24 NOTE — PHYSICAL EXAM
[FreeTextEntry1] : Alert, attentive, in no acute distress \par Appropriate affect and mood\par No dysarthria\par No definite facial asymmetry appreciable. No ptosis\par \par

## 2023-05-24 NOTE — HISTORY OF PRESENT ILLNESS
[FreeTextEntry1] : INTERIM HX 05/24/2023: Doing well from MS stand point, no new symptoms. In better spirits, more energy. On DMF, on maintenance dose now, experienced some GI symptoms, now improving. Main concern has been migraine headaches now, 2 weeks of daily migraines, sumatriptan not helping. \par \par INTERIM HX 04/26/2023: "I am upset and nervous". Reports she had new symptoms- woke up and could not move one day, "like rocks on me", "a fatigue I have never experienced". This lasted for a few days, stayed in PJs, felt better this morning. "My voice changes", "I get hoarse". Had another visual "aura" symptom, took sumatriptan it helped the headache, but make her tired. 2 falls, no injuries. Pt tearful and anxious during visit.\par \par INTERIM HX 04/12/2023: \par Spinal tap 3/28- Uinque and matched OCB (> 5 unique), 2 TNC, Lyme neg, ACE neg, VDRL neg, WNV neg. P38, G60. IgG index 0.8, MBP 5.2. \par MS mimicker labs in serum, including NMO and MOG ab negative. \par Vitamin D 55.6. B12 > 2000.\par \par Had post spinal headaches x 3 days, self resolved. \par no more visual episodes/\par Extreme fatigue.\par Tension headaches 3-4x/month, does not recall last migraine HA.\par Financial stress and worry.\par Tremors of hands. \par Also lived a very busy life, may have brushed off symptoms. When she first started her job after college, she would notice RLE numbness, attributed this to sitting long periods of time. \par Cannot remember things, word finding.\par A Pediatrician had recommended she be watched for "MS".\par ----------------------------------\par HPI (initial visit Mar 15, 2023)- SAMUEL LIM is a 52 year old woman w/ hx of bipolar disorder, depression, hypothyroidism, fibromyalgia, migraine headaches, referred by neurologist, Dr. Melvin Guo, to rule out MS. \par \par Of note, she was seen by Dr. Corbett (2/1/2023) for episodic vision loss OS. First episode was 5/2022, lost complete vision in left eye, "like a curtain", it lasted seconds but vision still remained blurry (both eyes) for another 30 min and also complained of pain with eye movements of left eye. Saw an Optometrist, thought it was related to contact lenses, given new contacts. Occurs 2-3 times/month (duration of episodes varies). No clear triggers. WIth last 2 episodes had double vision, never covered on eye to see if it improved. Neuro ophtho exam was normal and Dr. Corbett suspect aura migraines, given childhood hx of migraines. Recommended neurology evaluation. Has had carotid duplex which was reportedly normal.\par \par She keeps a note of all her symptoms. She has had multiple symptoms over the years. "I have had things over my life that have never been put together". "Extreme fatigue". "severe memory fog". Word finding difficulty- ~ 2020. Chest tightness. Chronic bowel issues- constipation, seeing GI recently and had colonoscopy, on MiraLAX, it is working. Mother has always told her she walks funny and slurs her words.  notices her balance is off, improving with PT. Looses her balance if she squats, this started about 7 years ago. She was diagnosed with fibromyalgia, knee pains/shoulder pains/ankle pain, seen rheumatologist, Isolated AMARI elevation, "flare ups now and then". Several incidents of vertigo, lasting 3-5 min, now resolved. Stiffness in neck/shoulders and other joints. She gets tension headaches. Urinary urgency and hesitancy. \par \par Recently seen by Dr Melvin Guo (neurologist), had MRI brain and spine at [facility] 3T MRI (pt brings reports and disc). I personally reviewed images. \par MRI brain w/o con 213/2023- scattered supratentorial WM lesions, more in R cerebral hemisphere, some punctuate and others ovoid appearing and periventricular (abutting Lat ventricle). No definite CC or infratentorial lesions. no enhancing lesions. The larger WM lesions have corresponding T1 black holes on T1 sequence. \par MRI brain and orbits w/w/o 3/2/2023- Stable brain MRI. Normal orbits. \par MRI C w/w/o 2/23/2023- no cervical cord lesion. Mild posterior disc bulging at several levels with mild thecal sac compression and b/l NF narrowing at C4-C5.\par MRI T spine w/w/o 2/24/2023- abnormal signal at anterior left T2 cord without enh. Cord atrophy in this region. \par \par She got  in July 2022. Diagnosed with Bipolar disorder 12 years ago (sees a psychopharmacologist), reports mood is well controlled. She was a  for 27 years, stopped working in 2021 due to brain fog.\par \par

## 2023-05-24 NOTE — DATA REVIEWED
[de-identified] : Recently seen by Dr Melvin Guo (neurologist), had MRI brain and spine at [facility] 3T MRI (pt brings reports and disc). I personally reviewed images. \par MRI brain w/o con 213/2023- scattered supratentorial WM lesions, more in R cerebral hemisphere, some punctuate and others ovoid appearing and periventricular (abutting Lat ventricle). No definite CC or infratentorial lesions. no enhancing lesions. The larger WM lesions have corresponding T1 black holes on T1 sequence. \par MRI brain and orbits w/w/o 3/2/2023- Stable brain MRI. Normal orbits. \par MRI C w/w/o 2/23/2023- no cervical cord lesion. Mild posterior disc bulging at several levels with mild thecal sac compression and b/l NF narrowing at C4-C5.\par MRI T spine w/w/o 2/24/2023- abnormal signal at anterior left T2 cord without enh. Cord atrophy in this region.  [de-identified] : Spinal tap 3/28- Uinque and matched OCB (> 5 unique), 2 TNC, Lyme neg, ACE neg, VDRL neg, WNV neg. P38, G60. IgG index 0.8, MBP 5.2. \par MS mimicker labs in serum, including NMO and MOG ab negative. \par Vitamin D 55.6. B12 > 2000.

## 2023-05-24 NOTE — ASSESSMENT
[FreeTextEntry1] : Assessment/Plan:\par 52 year old female referred by Dr Melvin Guo for consultation on possible MS. She has had a long standing hx of non specific symptoms, including imbalance, slurred speech, vertigo, brain fog with word finding difficulty, generalized joint pains and extreme fatigue, which have all led her to quit her job in 2021. Since May 2022, she has been experiencing intermittent transient monocular/binocular visual disturbances (vision loss/blurry vision/diplopia) with normal neuro ophthalmological exam by Dr Corbett 2/2023. She recently had MRI imaging of brain and spine which showed lesions on brain MRI and T spine MRI concerning for possible demyelinating disease with neurological exam also significant for upper motor neuron signs and spinal tap shows both unique and matched OCB.\par \par Her clinical history is non specific, no clear hx of "relapses", and a lot of her symptoms could be attributed to other conditions; for example the episodic visual disturbances could be aura migraines, the extreme fatigue/brain fog/cognitive issues could be related to mood disorder/bipolar disorder +/- on going psychosocial stressors (psychosomatic) , however at the same time given her objective findings on neurological exam (increased muscle tone in LLE, sustained clonus RLE) cannot definitely rule out a clinical CNS demyelinating event that occurred in the past which she either cannot recall or may have brushed off. \par \par At this time, with her MRI, CSF results, clinical hx and neurological exam I do favor a diagnosis of CNS demyelinating disease, most fitting with Multiple sclerosis.\par \par \par # Multiple Sclerosis (dx'd 4/2023), on DMF since 4/2023. Stable, mood better. \par # Migraine headaches- 2 weeks of HA's, sumatriptan not working. \par \par \par Plan:-\par 1. Diagnostic Plan/Imaging: Plan to repeat MRI brain, C/T spine w/w/o contrast 3 months after start of DMT. (order at next visit)\par \par 2. Disease Modifying therapy plan:\par Continue Dimethyl Fumarate: Continue 240 mg BID\par CBC and LFT every 6 months\par JCV antibody test every year\par Continue 462 mg (2 capsules) BID. \par Discussed common adverse reactions: Flushing, abdominal pain, diarrhea, and nausea. Patient can take non-enteric coated aspirin 30 minutes prior to dose to minimize flushing	\par \par 3. Symptomatic therapy plan:\par () Fatigue: Will continue to monitor. Discussed non pharmacological measures for addressing fatigue. Recommend focusing on mental health, sleep, diet and exercise. \par () Spasticity: Stretching exercises\par () Neuropathic pain: Will continue to monitor\par () Bladder Dysfunction: Referred to urology\par () Mobility- Recommend physical therapy for balance training and strengthening - pt deferred. Her  is a therapist.\par () Depression/Anxiety- F/u with psychiatrist (Dr. Israel). Recommend CBT\par () Vitamin D3 and B12 supplementations \par \par 4. Migraine auras:-\par [] Will switching sumatriptan to maxalt PRN for headaches. If ineffective, can consider eletriptan or anti-CGRP inhibitors for abortive therapy. \par [] Over the counter preventative therapies discussed, which include Magnesium 400 mg QD (discussed potential side effect of diarrhea), riboflavin 400 mg QD and Coenzyme Q10 100 mg daily.\par Headache education provided:\par [] Stay well hydrated\par [] Limit excessive caffeine and alcohol intake\par [] Maintain good sleep hygiene. Follow a consistent sleep and wake schedule. \par [] Practice good eating habits. Avoid skipping meals. \par [] Try to avoid any known triggers\par [] Avoid excessive use of over the counter pain medications, as they can cause medication overuse headaches \par [] Keep a headache diary\par [] Relaxation techniques, biofeedback, massage therapy, acupunctures, and heating pads may be effective\par \par \par \par \par Return to clinic 2 months\par \par The above plan was discussed with SAMUEL LIM in great detail. SAMUEL LIM verbalized understanding and agrees with plan as detailed above. Patient was provided education and counselling on current diagnosis/symptoms. She was advised to call our clinic at [phone] for any new or worsening symptoms, or with any questions or concerns. In case of acute onset of neurological symptoms or worsening presentation, patient was advised to present to nearest emergency room for further evaluation. SAMUEL LIM expressed understanding and all her questions/concerns were addressed.\par \par Lashonda Alvarado M.D\par

## 2023-06-12 ENCOUNTER — NON-APPOINTMENT (OUTPATIENT)
Age: 53
End: 2023-06-12

## 2023-06-19 ENCOUNTER — NON-APPOINTMENT (OUTPATIENT)
Age: 53
End: 2023-06-19

## 2023-06-30 ENCOUNTER — NON-APPOINTMENT (OUTPATIENT)
Age: 53
End: 2023-06-30

## 2023-07-01 ENCOUNTER — LABORATORY RESULT (OUTPATIENT)
Age: 53
End: 2023-07-01

## 2023-07-03 LAB
ALBUMIN SERPL ELPH-MCNC: 4.4 G/DL
ALP BLD-CCNC: 106 U/L
ALT SERPL-CCNC: 16 U/L
ANION GAP SERPL CALC-SCNC: 11 MMOL/L
APPEARANCE: CLEAR
AST SERPL-CCNC: 15 U/L
BILIRUB SERPL-MCNC: 0.3 MG/DL
BILIRUBIN URINE: NEGATIVE
BLOOD URINE: NEGATIVE
BUN SERPL-MCNC: 14 MG/DL
CALCIUM SERPL-MCNC: 10.2 MG/DL
CHLORIDE SERPL-SCNC: 107 MMOL/L
CO2 SERPL-SCNC: 25 MMOL/L
COLOR: YELLOW
CREAT SERPL-MCNC: 0.92 MG/DL
EGFR: 76 ML/MIN/1.73M2
GLUCOSE QUALITATIVE U: NEGATIVE MG/DL
GLUCOSE SERPL-MCNC: 92 MG/DL
KETONES URINE: NEGATIVE MG/DL
LEUKOCYTE ESTERASE URINE: ABNORMAL
NITRITE URINE: NEGATIVE
PH URINE: 6.5
POTASSIUM SERPL-SCNC: 4.7 MMOL/L
PROT SERPL-MCNC: 6.4 G/DL
PROTEIN URINE: NEGATIVE MG/DL
SODIUM SERPL-SCNC: 143 MMOL/L
SPECIFIC GRAVITY URINE: 1.01
UROBILINOGEN URINE: 0.2 MG/DL

## 2023-07-10 ENCOUNTER — NON-APPOINTMENT (OUTPATIENT)
Age: 53
End: 2023-07-10

## 2023-07-10 RX ORDER — DIMETHYL FUMARATE 240 MG/1
CAPSULE, DELAYED RELEASE ORAL
Qty: 60 | Refills: 3 | Status: DISCONTINUED | COMMUNITY
Start: 2023-04-28 | End: 2023-07-10

## 2023-07-10 RX ORDER — DIMETHYL FUMARATE 120 MG/1
CAPSULE, DELAYED RELEASE ORAL
Qty: 28 | Refills: 0 | Status: DISCONTINUED | COMMUNITY
Start: 2023-06-19 | End: 2023-07-10

## 2023-07-24 ENCOUNTER — APPOINTMENT (OUTPATIENT)
Dept: NEUROLOGY | Facility: CLINIC | Age: 53
End: 2023-07-24
Payer: MEDICAID

## 2023-07-24 VITALS
DIASTOLIC BLOOD PRESSURE: 69 MMHG | WEIGHT: 139 LBS | HEIGHT: 69 IN | HEART RATE: 82 BPM | BODY MASS INDEX: 20.59 KG/M2 | SYSTOLIC BLOOD PRESSURE: 106 MMHG

## 2023-07-24 PROCEDURE — 99214 OFFICE O/P EST MOD 30 MIN: CPT

## 2023-07-24 RX ORDER — DIROXIMEL FUMARATE 231 MG/1
CAPSULE ORAL
Qty: 120 | Refills: 6 | Status: DISCONTINUED | COMMUNITY
Start: 2023-07-10 | End: 2023-07-24

## 2023-07-24 RX ORDER — BUPROPION HYDROCHLORIDE 300 MG/1
300 TABLET, EXTENDED RELEASE ORAL
Qty: 30 | Refills: 0 | Status: ACTIVE | COMMUNITY
Start: 2023-04-18

## 2023-07-24 RX ORDER — DIROXIMEL FUMARATE 231 MG/1
CAPSULE ORAL
Qty: 120 | Refills: 0 | Status: DISCONTINUED | COMMUNITY
Start: 2023-07-10 | End: 2023-07-24

## 2023-07-24 RX ORDER — LITHIUM CARBONATE 450 MG/1
450 TABLET ORAL
Qty: 180 | Refills: 0 | Status: ACTIVE | COMMUNITY
Start: 2023-04-21

## 2023-07-25 NOTE — ASSESSMENT
[FreeTextEntry1] : Assessment/Plan:\par 52 year old female referred by Dr Melvin Guo for consultation on possible MS. She has had a long standing hx of non specific symptoms, including imbalance, slurred speech, vertigo, brain fog with word finding difficulty, generalized joint pains and extreme fatigue, which have all led her to quit her job in 2021. Since May 2022, she has been experiencing intermittent transient monocular/binocular visual disturbances (vision loss/blurry vision/diplopia) with normal neuro ophthalmological exam by Dr Corbett 2/2023. She recently had MRI imaging of brain and spine which showed lesions on brain MRI and T spine MRI concerning for possible demyelinating disease with neurological exam also significant for upper motor neuron signs and spinal tap shows both unique and matched OCB.\par \par Her clinical history is non specific, no clear hx of "relapses", and a lot of her symptoms could be attributed to other conditions; for example the episodic visual disturbances could be aura migraines, the extreme fatigue/brain fog/cognitive issues could be related to mood disorder/bipolar disorder +/- on going psychosocial stressors (psychosomatic) , however at the same time given her objective findings on neurological exam (increased muscle tone in LLE, sustained clonus RLE) cannot definitely rule out a clinical CNS demyelinating event that occurred in the past which she either cannot recall or may have brushed off. \par \par At this time, with her MRI, CSF results, clinical hx and neurological exam I do favor a diagnosis of CNS demyelinating disease, most fitting with Multiple sclerosis.\par \par \par # Multiple Sclerosis (dx'd 4/2023), on DMF since 4/2023. Stable, mood better. Not tolerating DMF or Vumerity (cramps and diarrhea).\par # Migraine headaches\par \par \par Plan:-\par 1. Diagnostic Plan/Imaging: Plan to repeat MRI brain, C/T spine w/w/o contrast 3 months after start of new DMT. (order at next visit)\par \par 2. Disease Modifying therapy plan:\par Dc Vumerity, will switch to Aubagio \par Start Aubagio (will email start form)\par - CBC prior to initiation (done)\par - Transaminase and bilirubin prior to initiation (done)\par - Monitor ALT levels monthly for 6 months\par - Monitor blood pressure as Aubagio may cause Increased blood pressure\par - Potential side effects of Aubagio were discussed and include headache, alopecia, hypophosphatemia, diarrhea/nausea and increased ALT, were discussed. The laboratory monitoring protocol was discussed.\par \par \par 3. Symptomatic therapy plan:\par () Fatigue: Will continue to monitor. Discussed non pharmacological measures for addressing fatigue. Recommend focusing on mental health, sleep, diet and exercise. \par () Spasticity: Stretching exercises\par () Neuropathic pain: Will continue to monitor\par () Bladder Dysfunction: Referred to urology\par () Mobility- Recommend physical therapy for balance training and strengthening - pt deferred. Her  is a therapist.\par () Depression/Anxiety- F/u with psychiatrist (Dr. Israel). Recommend CBT\par () Vitamin D3 and B12 supplementations \par \par 4. Migraine auras:-\par [] Maxalt PRN for headaches. If ineffective, can consider eletriptan or anti-CGRP inhibitors for abortive therapy. \par [] Over the counter preventative therapies discussed, which include Magnesium 400 mg QD (discussed potential side effect of diarrhea), riboflavin 400 mg QD and Coenzyme Q10 100 mg daily.\par Headache education provided:\par [] Stay well hydrated\par [] Limit excessive caffeine and alcohol intake\par [] Maintain good sleep hygiene. Follow a consistent sleep and wake schedule. \par [] Practice good eating habits. Avoid skipping meals. \par [] Try to avoid any known triggers\par [] Avoid excessive use of over the counter pain medications, as they can cause medication overuse headaches \par [] Keep a headache diary\par [] Relaxation techniques, biofeedback, massage therapy, acupunctures, and heating pads may be effective\par \par \par Return to clinic 3 months\par \par The above plan was discussed with SAMUEL LIM in great detail. SAMUEL LIM verbalized understanding and agrees with plan as detailed above. Patient was provided education and counselling on current diagnosis/symptoms. She was advised to call our clinic at [phone] for any new or worsening symptoms, or with any questions or concerns. In case of acute onset of neurological symptoms or worsening presentation, patient was advised to present to nearest emergency room for further evaluation. SAMUEL LIM expressed understanding and all her questions/concerns were addressed.\par \NIA CelesteD

## 2023-07-25 NOTE — PHYSICAL EXAM
[FreeTextEntry1] : Alert, attentive, in no acute distress \par Appropriate affect and mood\par No dysarthria\par No definite facial asymmetry appreciable. No ptosis\par Using walker for balance. No shuffling of gait.\par \par

## 2023-07-25 NOTE — HISTORY OF PRESENT ILLNESS
[FreeTextEntry1] : INTERIM HX 07/24/2023: Pt could not tolerate DMF- cramps and severe diarrhea. Med dc'd. Switched to Vumerity- started 1 week ago, started 2 cap BID yesterday, started to get abdominal cramps this AM, did not take AM dose today. Still having aches in stomach. No falls. Got walker for balance. \par \par INTERIM HX 05/24/2023: Doing well from MS stand point, no new symptoms. In better spirits, more energy. On DMF, on maintenance dose now, experienced some GI symptoms, now improving. Main concern has been migraine headaches now, 2 weeks of daily migraines, sumatriptan not helping. \par \par INTERIM HX 04/26/2023: "I am upset and nervous". Reports she had new symptoms- woke up and could not move one day, "like rocks on me", "a fatigue I have never experienced". This lasted for a few days, stayed in PJs, felt better this morning. "My voice changes", "I get hoarse". Had another visual "aura" symptom, took sumatriptan it helped the headache, but make her tired. 2 falls, no injuries. Pt tearful and anxious during visit.\par \par INTERIM HX 04/12/2023: \par Spinal tap 3/28- Uinque and matched OCB (> 5 unique), 2 TNC, Lyme neg, ACE neg, VDRL neg, WNV neg. P38, G60. IgG index 0.8, MBP 5.2. \par MS mimicker labs in serum, including NMO and MOG ab negative. \par Vitamin D 55.6. B12 > 2000.\par \par Had post spinal headaches x 3 days, self resolved. \par no more visual episodes/\par Extreme fatigue.\par Tension headaches 3-4x/month, does not recall last migraine HA.\par Financial stress and worry.\par Tremors of hands. \par Also lived a very busy life, may have brushed off symptoms. When she first started her job after college, she would notice RLE numbness, attributed this to sitting long periods of time. \par Cannot remember things, word finding.\par A Pediatrician had recommended she be watched for "MS".\par ----------------------------------\par HPI (initial visit Mar 15, 2023)- SAMUEL LIM is a 52 year old woman w/ hx of bipolar disorder, depression, hypothyroidism, fibromyalgia, migraine headaches, referred by neurologist, Dr. Melvin Guo, to rule out MS. \par \par Of note, she was seen by Dr. Corbett (2/1/2023) for episodic vision loss OS. First episode was 5/2022, lost complete vision in left eye, "like a curtain", it lasted seconds but vision still remained blurry (both eyes) for another 30 min and also complained of pain with eye movements of left eye. Saw an Optometrist, thought it was related to contact lenses, given new contacts. Occurs 2-3 times/month (duration of episodes varies). No clear triggers. WIth last 2 episodes had double vision, never covered on eye to see if it improved. Neuro ophtho exam was normal and Dr. Corbett suspect aura migraines, given childhood hx of migraines. Recommended neurology evaluation. Has had carotid duplex which was reportedly normal.\par \par She keeps a note of all her symptoms. She has had multiple symptoms over the years. "I have had things over my life that have never been put together". "Extreme fatigue". "severe memory fog". Word finding difficulty- ~ 2020. Chest tightness. Chronic bowel issues- constipation, seeing GI recently and had colonoscopy, on MiraLAX, it is working. Mother has always told her she walks funny and slurs her words.  notices her balance is off, improving with PT. Looses her balance if she squats, this started about 7 years ago. She was diagnosed with fibromyalgia, knee pains/shoulder pains/ankle pain, seen rheumatologist, Isolated AMARI elevation, "flare ups now and then". Several incidents of vertigo, lasting 3-5 min, now resolved. Stiffness in neck/shoulders and other joints. She gets tension headaches. Urinary urgency and hesitancy. Stable MS. \par \par Recently seen by Dr Melvin Guo (neurologist), had MRI brain and spine at [facility] 3T MRI (pt brings reports and disc). I personally reviewed images. \par MRI brain w/o con 2/13/2023- scattered supratentorial WM lesions, more in R cerebral hemisphere, some punctuate and others ovoid appearing and periventricular (abutting Lat ventricle). No definite CC or infratentorial lesions. no enhancing lesions. The larger WM lesions have corresponding T1 black holes on T1 sequence. \par MRI brain and orbits w/w/o 3/2/2023- Stable brain MRI. Normal orbits. \par MRI C w/w/o 2/23/2023- no cervical cord lesion. Mild posterior disc bulging at several levels with mild thecal sac compression and b/l NF narrowing at C4-C5.\par MRI T spine w/w/o 2/24/2023- abnormal signal at anterior left T2 cord without enh. Cord atrophy in this region. \par \par She got  in July 2022. Diagnosed with Bipolar disorder 12 years ago (sees a psychopharmacologist), reports mood is well controlled. She was a  for 27 years, stopped working in 2021 due to brain fog.\par \par

## 2023-07-25 NOTE — DATA REVIEWED
[de-identified] : Recently seen by Dr Melvin Guo (neurologist), had MRI brain and spine at [facility] 3T MRI (pt brings reports and disc). I personally reviewed images. \par MRI brain w/o con 2/13/2023- scattered supratentorial WM lesions, more in R cerebral hemisphere, some punctuate and others ovoid appearing and periventricular (abutting Lat ventricle). No definite CC or infratentorial lesions. no enhancing lesions. The larger WM lesions have corresponding T1 black holes on T1 sequence. \par MRI brain and orbits w/w/o 3/2/2023- Stable brain MRI. Normal orbits. \par MRI C w/w/o 2/23/2023- no cervical cord lesion. Mild posterior disc bulging at several levels with mild thecal sac compression and b/l NF narrowing at C4-C5.\par MRI T spine w/w/o 2/24/2023- abnormal signal at anterior left T2 cord without enh. Cord atrophy in this region.  [de-identified] : Spinal tap 3/28- Uinque and matched OCB (> 5 unique), 2 TNC, Lyme neg, ACE neg, VDRL neg, WNV neg. P38, G60. IgG index 0.8, MBP 5.2. \par MS mimicker labs in serum, including NMO and MOG ab negative. \par Vitamin D 55.6. B12 > 2000.

## 2023-07-31 ENCOUNTER — NON-APPOINTMENT (OUTPATIENT)
Age: 53
End: 2023-07-31

## 2023-08-12 ENCOUNTER — RESULT REVIEW (OUTPATIENT)
Age: 53
End: 2023-08-12

## 2023-08-14 LAB
25(OH)D3 SERPL-MCNC: 39.5 NG/ML
ALBUMIN SERPL ELPH-MCNC: 5.1 G/DL
ALP BLD-CCNC: 112 U/L
ALT SERPL-CCNC: 15 U/L
AST SERPL-CCNC: 14 U/L
BILIRUB DIRECT SERPL-MCNC: 0.2 MG/DL
BILIRUB INDIRECT SERPL-MCNC: 0.3 MG/DL
BILIRUB SERPL-MCNC: 0.4 MG/DL
BUN SERPL-MCNC: 15 MG/DL
CREAT SERPL-MCNC: 0.79 MG/DL
DEPRECATED KAPPA LC FREE/LAMBDA SER: 1.42 RATIO
EGFR: 91 ML/MIN/1.73M2
HBV CORE IGG+IGM SER QL: NONREACTIVE
HBV CORE IGM SER QL: NONREACTIVE
HBV SURFACE AB SER QL: NONREACTIVE
HBV SURFACE AG SER QL: NONREACTIVE
IGA SER QL IEP: 141 MG/DL
IGG SER QL IEP: 997 MG/DL
IGM SER QL IEP: 176 MG/DL
KAPPA LC CSF-MCNC: 1.99 MG/DL
KAPPA LC SERPL-MCNC: 2.83 MG/DL
PROT SERPL-MCNC: 7.2 G/DL
VZV AB TITR SER: POSITIVE
VZV IGG SER IF-ACNC: 2479 INDEX
VZV IGM SER IF-ACNC: <0.91 INDEX

## 2023-08-21 DIAGNOSIS — M54.12 RADICULOPATHY, CERVICAL REGION: ICD-10-CM

## 2023-08-21 DIAGNOSIS — R26.89 OTHER ABNORMALITIES OF GAIT AND MOBILITY: ICD-10-CM

## 2023-08-21 LAB
M TB IFN-G BLD-IMP: ABNORMAL
QUANTIFERON TB PLUS MITOGEN MINUS NIL: 0.17 IU/ML
QUANTIFERON TB PLUS NIL: 0.01 IU/ML
QUANTIFERON TB PLUS TB1 MINUS NIL: 0 IU/ML
QUANTIFERON TB PLUS TB2 MINUS NIL: 0 IU/ML

## 2023-08-22 ENCOUNTER — APPOINTMENT (OUTPATIENT)
Dept: MRI IMAGING | Facility: CLINIC | Age: 53
End: 2023-08-22
Payer: MEDICAID

## 2023-08-22 PROCEDURE — A9585: CPT

## 2023-08-22 PROCEDURE — A9585: CPT | Mod: JW

## 2023-08-22 PROCEDURE — 76377 3D RENDER W/INTRP POSTPROCES: CPT

## 2023-08-22 PROCEDURE — 70553 MRI BRAIN STEM W/O & W/DYE: CPT

## 2023-08-22 PROCEDURE — 72157 MRI CHEST SPINE W/O & W/DYE: CPT

## 2023-08-22 PROCEDURE — 72156 MRI NECK SPINE W/O & W/DYE: CPT

## 2023-08-29 ENCOUNTER — TRANSCRIPTION ENCOUNTER (OUTPATIENT)
Age: 53
End: 2023-08-29

## 2023-08-30 ENCOUNTER — NON-APPOINTMENT (OUTPATIENT)
Age: 53
End: 2023-08-30

## 2023-08-31 ENCOUNTER — TRANSCRIPTION ENCOUNTER (OUTPATIENT)
Age: 53
End: 2023-08-31

## 2023-08-31 LAB
ALBUMIN SERPL ELPH-MCNC: 4.8 G/DL
ALP BLD-CCNC: 106 U/L
ALT SERPL-CCNC: 24 U/L
AST SERPL-CCNC: 19 U/L
BILIRUB DIRECT SERPL-MCNC: 0.1 MG/DL
BILIRUB INDIRECT SERPL-MCNC: 0.2 MG/DL
BILIRUB SERPL-MCNC: 0.4 MG/DL
JCV INDEX: 0.16
M TB IFN-G BLD-IMP: NEGATIVE
PROT SERPL-MCNC: 7 G/DL
QUANTIFERON TB PLUS MITOGEN MINUS NIL: 2.24 IU/ML
QUANTIFERON TB PLUS NIL: 0.01 IU/ML
QUANTIFERON TB PLUS TB1 MINUS NIL: 0 IU/ML
QUANTIFERON TB PLUS TB2 MINUS NIL: 0 IU/ML
STRATIFY JCV ANTIBODY: NEGATIVE

## 2023-09-11 ENCOUNTER — NON-APPOINTMENT (OUTPATIENT)
Age: 53
End: 2023-09-11

## 2023-09-28 ENCOUNTER — LABORATORY RESULT (OUTPATIENT)
Age: 53
End: 2023-09-28

## 2023-10-02 ENCOUNTER — TRANSCRIPTION ENCOUNTER (OUTPATIENT)
Age: 53
End: 2023-10-02

## 2023-10-02 LAB
APPEARANCE: CLEAR
BILIRUBIN URINE: NEGATIVE
BLOOD URINE: NEGATIVE
COLOR: YELLOW
GLUCOSE QUALITATIVE U: NEGATIVE MG/DL
KETONES URINE: NEGATIVE MG/DL
LEUKOCYTE ESTERASE URINE: ABNORMAL
NITRITE URINE: NEGATIVE
PH URINE: 6.5
PROTEIN URINE: NORMAL MG/DL
SPECIFIC GRAVITY URINE: 1.02
UROBILINOGEN URINE: 0.2 MG/DL

## 2023-10-18 ENCOUNTER — TRANSCRIPTION ENCOUNTER (OUTPATIENT)
Age: 53
End: 2023-10-18

## 2023-10-19 ENCOUNTER — APPOINTMENT (OUTPATIENT)
Dept: NEUROLOGY | Facility: CLINIC | Age: 53
End: 2023-10-19
Payer: MEDICAID

## 2023-10-19 VITALS
BODY MASS INDEX: 22.22 KG/M2 | WEIGHT: 150 LBS | HEART RATE: 69 BPM | DIASTOLIC BLOOD PRESSURE: 70 MMHG | HEIGHT: 69 IN | SYSTOLIC BLOOD PRESSURE: 107 MMHG

## 2023-10-19 DIAGNOSIS — R42 DIZZINESS AND GIDDINESS: ICD-10-CM

## 2023-10-19 PROCEDURE — 99214 OFFICE O/P EST MOD 30 MIN: CPT

## 2023-10-19 RX ORDER — PREDNISONE 10 MG/1
10 TABLET ORAL
Qty: 21 | Refills: 0 | Status: DISCONTINUED | COMMUNITY
Start: 2023-09-20 | End: 2023-10-19

## 2023-10-19 RX ORDER — TERIFLUNOMIDE 14 MG/1
14 TABLET, FILM COATED ORAL
Qty: 30 | Refills: 6 | Status: DISCONTINUED | COMMUNITY
Start: 2023-07-24 | End: 2023-10-19

## 2023-10-19 RX ORDER — METHYLPREDNISOLONE 4 MG/1
4 TABLET ORAL
Qty: 1 | Refills: 0 | Status: DISCONTINUED | COMMUNITY
Start: 2023-08-11 | End: 2023-10-19

## 2023-10-30 ENCOUNTER — RX RENEWAL (OUTPATIENT)
Age: 53
End: 2023-10-30

## 2023-11-07 ENCOUNTER — APPOINTMENT (OUTPATIENT)
Dept: NEUROLOGY | Facility: CLINIC | Age: 53
End: 2023-11-07
Payer: MEDICAID

## 2023-11-07 PROCEDURE — 99214 OFFICE O/P EST MOD 30 MIN: CPT | Mod: 95

## 2023-11-07 RX ORDER — RIZATRIPTAN BENZOATE 10 MG/1
10 TABLET ORAL
Qty: 15 | Refills: 6 | Status: DISCONTINUED | COMMUNITY
Start: 2023-05-24 | End: 2023-11-07

## 2023-11-07 RX ORDER — METHYLPREDNISOLONE 4 MG/1
4 TABLET ORAL
Qty: 1 | Refills: 0 | Status: DISCONTINUED | COMMUNITY
Start: 2023-10-26 | End: 2023-11-07

## 2023-11-08 RX ORDER — RIMEGEPANT SULFATE 75 MG/75MG
75 TABLET, ORALLY DISINTEGRATING ORAL
Qty: 1 | Refills: 3 | Status: ACTIVE | COMMUNITY
Start: 2023-10-19 | End: 1900-01-01

## 2023-12-05 ENCOUNTER — APPOINTMENT (OUTPATIENT)
Dept: OPHTHALMOLOGY | Facility: CLINIC | Age: 53
End: 2023-12-05
Payer: COMMERCIAL

## 2023-12-05 ENCOUNTER — NON-APPOINTMENT (OUTPATIENT)
Age: 53
End: 2023-12-05

## 2023-12-05 ENCOUNTER — TRANSCRIPTION ENCOUNTER (OUTPATIENT)
Age: 53
End: 2023-12-05

## 2023-12-05 ENCOUNTER — RX RENEWAL (OUTPATIENT)
Age: 53
End: 2023-12-05

## 2023-12-05 PROCEDURE — 92015 DETERMINE REFRACTIVE STATE: CPT

## 2023-12-05 PROCEDURE — 92014 COMPRE OPH EXAM EST PT 1/>: CPT

## 2023-12-06 ENCOUNTER — TRANSCRIPTION ENCOUNTER (OUTPATIENT)
Age: 53
End: 2023-12-06

## 2023-12-18 ENCOUNTER — TRANSCRIPTION ENCOUNTER (OUTPATIENT)
Age: 53
End: 2023-12-18

## 2024-01-03 ENCOUNTER — APPOINTMENT (OUTPATIENT)
Dept: NEUROLOGY | Facility: CLINIC | Age: 54
End: 2024-01-03
Payer: MEDICAID

## 2024-01-03 DIAGNOSIS — M79.2 NEURALGIA AND NEURITIS, UNSPECIFIED: ICD-10-CM

## 2024-01-03 PROCEDURE — 99215 OFFICE O/P EST HI 40 MIN: CPT

## 2024-01-03 NOTE — HISTORY OF PRESENT ILLNESS
[FreeTextEntry1] : INTERIM HX 01/03/2024:  [This is a telehealth 2-way video visit] New symptom this morning- on left outer thigh, from hip down to knee. "Like electric shock pain". "very scary". It occurred twice since this AM. Standing up both times. Area over thigh tender to touch. It occurred once on the right, same distribution, no soreness on the right. No rash/skin changes. no numbness/.  Emgality is a "game changer". no migraines in a while.  Vertigo better, on and off.  Doing meditation.   INTERIM HX 11/07/2023: [This is a telehealth 2-way video visit]. Experiencing frequent headaches. Almost daily. Treated with medrol dose pack the end of Oct. While on steroids nurtec worked better, but never got 100% better. After completing steroids, nurtec stopped working. This past weekend, was in bed all day because of headache. "Extreme pain". Needs to stay in dark room.  INTERIM HX 10/19/2023: Could not tolerated Aubagio, GI s/e. Med dc'd. now s/p ocrevus loading doses 9/2023. Felt better/stronger after infusions.  Getting migraine headaches daily. MAxalt not helping.  Pt been experiencing episodic vertigo, meclizine helps. Needs to start vestibular therapy. MRI brain, C and T spine w/w/o 8/22/2023 (compared w/ 2/2023)- brain MRI is stable. MRI C spine with right C7 cord lesion (not reported on prior MRI but on my review was present back in 2/2023 as well). MRI T spine with stable T2 cord lesion. Overall MRIs stable.  INTERIM HX 07/24/2023: Pt could not tolerate DMF- cramps and severe diarrhea. Med dc'd. Switched to Vumerity- started 1 week ago, started 2 cap BID yesterday, started to get abdominal cramps this AM, did not take AM dose today. Still having aches in stomach. No falls. Got walker for balance.   INTERIM HX 05/24/2023: Doing well from MS stand point, no new symptoms. In better spirits, more energy. On DMF, on maintenance dose now, experienced some GI symptoms, now improving. Main concern has been migraine headaches now, 2 weeks of daily migraines, sumatriptan not helping.   INTERIM HX 04/26/2023: "I am upset and nervous". Reports she had new symptoms- woke up and could not move one day, "like rocks on me", "a fatigue I have never experienced". This lasted for a few days, stayed in PJs, felt better this morning. "My voice changes", "I get hoarse". Had another visual "aura" symptom, took sumatriptan it helped the headache, but make her tired. 2 falls, no injuries. Pt tearful and anxious during visit.  INTERIM HX 04/12/2023:  Spinal tap 3/28- [place] and matched OCB (> 5 unique), 2 TNC, Lyme neg, ACE neg, VDRL neg, WNV neg. P38, G60. IgG index 0.8, MBP 5.2.  MS mimicker labs in serum, including NMO and MOG ab negative.  Vitamin D 55.6. B12 > 2000.  Had post spinal headaches x 3 days, self resolved.  no more visual episodes/ Extreme fatigue. Tension headaches 3-4x/month, does not recall last migraine HA. Financial stress and worry. Tremors of hands.  Also lived a very busy life, may have brushed off symptoms. When she first started her job after college, she would notice RLE numbness, attributed this to sitting long periods of time.  Cannot remember things, word finding. A Pediatrician had recommended she be watched for "MS". ---------------------------------- HPI (initial visit Mar 15, 2023)- SAMUEL LIM is a 52 year old woman w/ hx of bipolar disorder, depression, hypothyroidism, fibromyalgia, migraine headaches, referred by neurologist, Dr. Melvin Guo, to rule out MS.   Of note, she was seen by Dr. Corbett (2/1/2023) for episodic vision loss OS. First episode was 5/2022, lost complete vision in left eye, "like a curtain", it lasted seconds but vision still remained blurry (both eyes) for another 30 min and also complained of pain with eye movements of left eye. Saw an Optometrist, thought it was related to contact lenses, given new contacts. Occurs 2-3 times/month (duration of episodes varies). No clear triggers. WIth last 2 episodes had double vision, never covered on eye to see if it improved. Neuro ophtho exam was normal and Dr. Corbett suspect aura migraines, given childhood hx of migraines. Recommended neurology evaluation. Has had carotid duplex which was reportedly normal.  She keeps a note of all her symptoms. She has had multiple symptoms over the years. "I have had things over my life that have never been put together". "Extreme fatigue". "severe memory fog". Word finding difficulty- ~ 2020. Chest tightness. Chronic bowel issues- constipation, seeing GI recently and had colonoscopy, on MiraLAX, it is working. Mother has always told her she walks funny and slurs her words.  notices her balance is off, improving with PT. Looses her balance if she squats, this started about 7 years ago. She was diagnosed with fibromyalgia, knee pains/shoulder pains/ankle pain, seen rheumatologist, Isolated AMARI elevation, "flare ups now and then". Several incidents of vertigo, lasting 3-5 min, now resolved. Stiffness in neck/shoulders and other joints. She gets tension headaches. Urinary urgency and hesitancy. Stable MS.   Recently seen by Dr Melvin Guo (neurologist), had MRI brain and spine at [facility] 3T MRI (pt brings reports and disc). I personally reviewed images.  MRI brain w/o con 2/13/2023- scattered supratentorial WM lesions, more in R cerebral hemisphere, some punctuate and others ovoid appearing and periventricular (abutting Lat ventricle). No definite CC or infratentorial lesions. no enhancing lesions. The larger WM lesions have corresponding T1 black holes on T1 sequence.  MRI brain and orbits w/w/o 3/2/2023- Stable brain MRI. Normal orbits.  MRI C w/w/o 2/23/2023- no cervical cord lesion. Mild posterior disc bulging at several levels with mild thecal sac compression and b/l NF narrowing at C4-C5. MRI T spine w/w/o 2/24/2023- abnormal signal at anterior left T2 cord without enh. Cord atrophy in this region.   She got  in July 2022. Diagnosed with Bipolar disorder 12 years ago (sees a psychopharmacologist), reports mood is well controlled. She was a  for 27 years, stopped working in 2021 due to brain fog.

## 2024-01-03 NOTE — DATA REVIEWED
[de-identified] : MRI brain, C and T spine w/w/o 8/22/2023 (compared w/ 2/2023)- brain MRI is stable. MRI C spine with right C7 cord lesion (not reported on prior MRI but on my review was present back in 2/2023 as well). MRI T spine with stable T2 cord lesion. Overall MRIs stable.  Recently seen by Dr Melvin Guo (neurologist), had MRI brain and spine at [facility] 3T MRI (pt brings reports and disc). I personally reviewed images.  MRI brain w/o con 2/13/2023- scattered supratentorial WM lesions, more in R cerebral hemisphere, some punctuate and others ovoid appearing and periventricular (abutting Lat ventricle). No definite CC or infratentorial lesions. no enhancing lesions. The larger WM lesions have corresponding T1 black holes on T1 sequence.  MRI brain and orbits w/w/o 3/2/2023- Stable brain MRI. Normal orbits.  MRI C w/w/o 2/23/2023- no cervical cord lesion. Mild posterior disc bulging at several levels with mild thecal sac compression and b/l NF narrowing at C4-C5. MRI T spine w/w/o 2/24/2023- abnormal signal at anterior left T2 cord without enh. Cord atrophy in this region.  [de-identified] : Spinal tap 3/28- [place] and matched OCB (> 5 unique), 2 TNC, Lyme neg, ACE neg, VDRL neg, WNV neg. P38, G60. IgG index 0.8, MBP 5.2. \par  MS mimicker labs in serum, including NMO and MOG ab negative. \par  Vitamin D 55.6. B12 > 2000.

## 2024-01-03 NOTE — ASSESSMENT
[FreeTextEntry1] : Assessment/Plan: 53 year old female referred by Dr Melvin Guo for consultation on possible MS. She has had a long standing hx of non specific symptoms, including imbalance, slurred speech, vertigo, brain fog with word finding difficulty, generalized joint pains and extreme fatigue, which have all led her to quit her job in 2021. Since May 2022, she has been experiencing intermittent transient monocular/binocular visual disturbances (vision loss/blurry vision/diplopia) with normal neuro ophthalmological exam by Dr Corbett 2/2023. She recently had MRI imaging of brain and spine which showed lesions on brain MRI and T spine MRI concerning for possible demyelinating disease with neurological exam also significant for upper motor neuron signs and spinal tap shows both unique and matched OCB.   Her clinical history is non specific, no clear hx of "relapses", and a lot of her symptoms could be attributed to other conditions; for example the episodic visual disturbances could be aura migraines, the extreme fatigue/brain fog/cognitive issues could be related to mood disorder/bipolar disorder +/- on going psychosocial stressors (psychosomatic) , however at the same time given her objective findings on neurological exam (increased muscle tone in LLE, sustained clonus RLE) cannot definitely rule out a clinical CNS demyelinating event that occurred in the past which she either cannot recall or may have brushed off.   At this time, with her MRI, CSF results, clinical hx and neurological exam I do favor a diagnosis of CNS demyelinating disease, most fitting with Multiple sclerosis.    # Multiple Sclerosis (dx'd 4/2023), on Ocrevus since 8/2023- clinically and radiologically stable # Migraine headaches- Better # Episodic vertigo - most likely peripheral vestibular disorder. Better # New onset b/l lateral thigh (L>R) pain x 1 days-  meralgia paresthetica vs lumbar radiculopathy    Plan:- 1. Diagnostic Plan/Imaging: Plan to repeat MRI brain w/w/o contrast 2/2024 for radiological stability on Ocrevus.   2. Disease Modifying therapy plan: Continue Ocrelizumab 600 mg q6 monthly infusion. Ocrelizumab labs every 6 months (CBC with diff, LFT, BUN, Creatinine) - labs   3. Symptomatic therapy plan: () Fatigue: Discussed non pharmacological measures for addressing fatigue. Recommend focusing on mental health, sleep, diet and exercise. () Spasticity: Stretching exercises () Neuropathic pain: Will continue to monitor () Bladder Dysfunction: Referred to urology for neurogenic bladder () Gait: Walker. Consider Ampyra in the future. () Depression/Anxiety- F/u with psychiatrist (Dr. Israel). Recommend CBT () Vitamin D3 and B12 supplementations   4. Migraine headaches w/ auras:- better controlled on Emgality.  [] Continue Nurtec 75 mg QD PRN for headache rescue therapy. Reviewed side effects. (Failed Maxalt and sumatriptan) [] Continue emgality as preventative therapy. s/e reviewed.  - Pt on several [facility] meds, would avoid anti-depressants as preventative  - Given significant fatigue/drowsiness due to MS, would avoid Topamax  - Given chronically low BP and risk for falls, would avoid Bblockers. [] Over the counter preventative therapies discussed, which include Magnesium 400 mg QD (discussed potential side effect of diarrhea), riboflavin 400 mg QD and Coenzyme Q10 100 mg daily.  5. Episodic vertigo- Referred to vestibular therapy. Meclizine PRN. Comes/goes, improved.   6. b/l neuropathic pain over lateral thigh (L>R)- meralgia paresthetica vs lumbar radiculopathy Recommend rest, anti-inflammatory medications, massage therapy, heating pads, and avoiding activity that strains back.  Ibuprofen as needed for pain. Lidocaine patches as needed. If symptoms worsen, will refer to PT and order MRI L spine.  Return to clinic as scheduled    The above plan was discussed with SAMUEL LIM in great detail. SAMUEL LIM verbalized understanding and agrees with plan as detailed above. Patient was provided education and counselling on current diagnosis/symptoms. She was advised to call our clinic at [phone] for any new or worsening symptoms, or with any questions or concerns. SAMUEL LIM expressed understanding and all her questions/concerns were addressed.    Lashonda Alvarado M.D.

## 2024-01-03 NOTE — PHYSICAL EXAM
[FreeTextEntry1] : Alert, attentive, in no acute distress  Appropriate affect and mood Memory of recent events intact No dysarthria No definite facial asymmetry appreciable. No ptosis No rash noted on legs.

## 2024-01-11 ENCOUNTER — OUTPATIENT (OUTPATIENT)
Dept: OUTPATIENT SERVICES | Facility: HOSPITAL | Age: 54
LOS: 1 days | End: 2024-01-11
Payer: MEDICARE

## 2024-01-11 ENCOUNTER — APPOINTMENT (OUTPATIENT)
Dept: MRI IMAGING | Facility: CLINIC | Age: 54
End: 2024-01-11
Payer: MEDICAID

## 2024-01-11 DIAGNOSIS — G35 MULTIPLE SCLEROSIS: ICD-10-CM

## 2024-01-11 PROCEDURE — 70553 MRI BRAIN STEM W/O & W/DYE: CPT

## 2024-01-11 PROCEDURE — 70553 MRI BRAIN STEM W/O & W/DYE: CPT | Mod: 26,MH

## 2024-01-11 PROCEDURE — A9585: CPT

## 2024-01-22 ENCOUNTER — TRANSCRIPTION ENCOUNTER (OUTPATIENT)
Age: 54
End: 2024-01-22

## 2024-01-31 ENCOUNTER — NON-APPOINTMENT (OUTPATIENT)
Age: 54
End: 2024-01-31

## 2024-01-31 ENCOUNTER — APPOINTMENT (OUTPATIENT)
Dept: OPHTHALMOLOGY | Facility: CLINIC | Age: 54
End: 2024-01-31
Payer: MEDICARE

## 2024-01-31 PROCEDURE — 99214 OFFICE O/P EST MOD 30 MIN: CPT

## 2024-01-31 PROCEDURE — 92083 EXTENDED VISUAL FIELD XM: CPT

## 2024-01-31 RX ORDER — MODAFINIL 100 MG/1
100 TABLET ORAL
Qty: 60 | Refills: 0 | Status: ACTIVE | COMMUNITY
Start: 2023-12-05 | End: 1900-01-01

## 2024-01-31 RX ORDER — GABAPENTIN 300 MG/1
300 CAPSULE ORAL TWICE DAILY
Qty: 60 | Refills: 0 | Status: ACTIVE | COMMUNITY
Start: 2023-08-11 | End: 1900-01-01

## 2024-02-07 ENCOUNTER — APPOINTMENT (OUTPATIENT)
Dept: NEUROLOGY | Facility: CLINIC | Age: 54
End: 2024-02-07
Payer: MEDICARE

## 2024-02-07 VITALS
DIASTOLIC BLOOD PRESSURE: 86 MMHG | HEART RATE: 79 BPM | WEIGHT: 150 LBS | HEIGHT: 69 IN | SYSTOLIC BLOOD PRESSURE: 130 MMHG | BODY MASS INDEX: 22.22 KG/M2 | OXYGEN SATURATION: 99 %

## 2024-02-07 DIAGNOSIS — G43.909 MIGRAINE, UNSPECIFIED, NOT INTRACTABLE, W/OUT STATUS MIGRAINOSUS: ICD-10-CM

## 2024-02-07 DIAGNOSIS — G35 MULTIPLE SCLEROSIS: ICD-10-CM

## 2024-02-07 DIAGNOSIS — M54.16 RADICULOPATHY, LUMBAR REGION: ICD-10-CM

## 2024-02-07 LAB
ALBUMIN SERPL ELPH-MCNC: 4.7 G/DL
ALBUMIN SERPL ELPH-MCNC: 4.7 G/DL
ALP BLD-CCNC: 117 U/L
ALP BLD-CCNC: 119 U/L
ALT SERPL-CCNC: 16 U/L
ALT SERPL-CCNC: 17 U/L
ANION GAP SERPL CALC-SCNC: 13 MMOL/L
AST SERPL-CCNC: 18 U/L
AST SERPL-CCNC: 18 U/L
BASOPHILS # BLD AUTO: 0.1 K/UL
BASOPHILS NFR BLD AUTO: 1.2 %
BILIRUB DIRECT SERPL-MCNC: 0.1 MG/DL
BILIRUB INDIRECT SERPL-MCNC: 0.3 MG/DL
BILIRUB SERPL-MCNC: 0.4 MG/DL
BILIRUB SERPL-MCNC: 0.4 MG/DL
BUN SERPL-MCNC: 14 MG/DL
CALCIUM SERPL-MCNC: 9.7 MG/DL
CHLORIDE SERPL-SCNC: 106 MMOL/L
CO2 SERPL-SCNC: 24 MMOL/L
CREAT SERPL-MCNC: 0.85 MG/DL
EGFR: 83 ML/MIN/1.73M2
EOSINOPHIL # BLD AUTO: 0.45 K/UL
EOSINOPHIL NFR BLD AUTO: 5.3 %
GLUCOSE SERPL-MCNC: 113 MG/DL
HCT VFR BLD CALC: 38 %
HGB BLD-MCNC: 11.7 G/DL
IMM GRANULOCYTES NFR BLD AUTO: 0.2 %
LYMPHOCYTES # BLD AUTO: 1.08 K/UL
LYMPHOCYTES NFR BLD AUTO: 12.6 %
MAN DIFF?: NORMAL
MCHC RBC-ENTMCNC: 30.8 GM/DL
MCHC RBC-ENTMCNC: 30.9 PG
MCV RBC AUTO: 100.3 FL
MONOCYTES # BLD AUTO: 0.67 K/UL
MONOCYTES NFR BLD AUTO: 7.8 %
NEUTROPHILS # BLD AUTO: 6.24 K/UL
NEUTROPHILS NFR BLD AUTO: 72.9 %
PLATELET # BLD AUTO: 302 K/UL
POTASSIUM SERPL-SCNC: 3.9 MMOL/L
PROT SERPL-MCNC: 6.6 G/DL
PROT SERPL-MCNC: 6.8 G/DL
RBC # BLD: 3.79 M/UL
RBC # FLD: 13.9 %
SODIUM SERPL-SCNC: 142 MMOL/L
WBC # FLD AUTO: 8.56 K/UL

## 2024-02-07 PROCEDURE — 99215 OFFICE O/P EST HI 40 MIN: CPT

## 2024-02-07 PROCEDURE — 99205 OFFICE O/P NEW HI 60 MIN: CPT

## 2024-02-07 RX ORDER — GALCANEZUMAB 120 MG/ML
120 INJECTION, SOLUTION SUBCUTANEOUS
Qty: 2 | Refills: 0 | Status: DISCONTINUED | COMMUNITY
Start: 2023-11-07 | End: 2024-02-07

## 2024-02-08 ENCOUNTER — TRANSCRIPTION ENCOUNTER (OUTPATIENT)
Age: 54
End: 2024-02-08

## 2024-02-09 ENCOUNTER — OUTPATIENT (OUTPATIENT)
Dept: OUTPATIENT SERVICES | Facility: HOSPITAL | Age: 54
LOS: 1 days | End: 2024-02-09
Payer: MEDICARE

## 2024-02-09 ENCOUNTER — APPOINTMENT (OUTPATIENT)
Dept: MRI IMAGING | Facility: CLINIC | Age: 54
End: 2024-02-09
Payer: MEDICARE

## 2024-02-09 ENCOUNTER — RESULT REVIEW (OUTPATIENT)
Age: 54
End: 2024-02-09

## 2024-02-09 DIAGNOSIS — Z00.8 ENCOUNTER FOR OTHER GENERAL EXAMINATION: ICD-10-CM

## 2024-02-09 PROCEDURE — 70543 MRI ORBT/FAC/NCK W/O &W/DYE: CPT | Mod: MB

## 2024-02-09 PROCEDURE — 70543 MRI ORBT/FAC/NCK W/O &W/DYE: CPT | Mod: 26,MB

## 2024-02-13 NOTE — DATA REVIEWED
[de-identified] : MR Head 1/11/24 stable.  MRI brain, C and T spine w/w/o 8/22/2023 (compared w/ 2/2023)- brain MRI is stable. MRI C spine with right C7 cord lesion (not reported on prior MRI but on my review was present back in 2/2023 as well). MRI T spine with stable T2 cord lesion. Overall MRIs stable.  Recently seen by Dr Melvin Guo (neurologist), had MRI brain and spine at [facility] 3T MRI (pt brings reports and disc). I personally reviewed images.  MRI brain w/o con 2/13/2023- scattered supratentorial WM lesions, more in R cerebral hemisphere, some punctuate and others ovoid appearing and periventricular (abutting Lat ventricle). No definite CC or infratentorial lesions. no enhancing lesions. The larger WM lesions have corresponding T1 black holes on T1 sequence.  MRI brain and orbits w/w/o 3/2/2023- Stable brain MRI. Normal orbits.  MRI C w/w/o 2/23/2023- no cervical cord lesion. Mild posterior disc bulging at several levels with mild thecal sac compression and b/l NF narrowing at C4-C5. MRI T spine w/w/o 2/24/2023- abnormal signal at anterior left T2 cord without enh. Cord atrophy in this region.  [de-identified] : Spinal tap 3/28- [place] and matched OCB (> 5 unique), 2 TNC, Lyme neg, ACE neg, VDRL neg, WNV neg. P38, G60. IgG index 0.8, MBP 5.2. \par  MS mimicker labs in serum, including NMO and MOG ab negative. \par  Vitamin D 55.6. B12 > 2000.

## 2024-02-13 NOTE — HISTORY OF PRESENT ILLNESS
[FreeTextEntry1] : INTERIM HX 02/07/2024: MR Head 1/11/24 stable. Overall, feeling a lot better.  Modafinil working great for fatigue. However, insurance not approving renewal. Emgality works great for migraines, "zero". When stressed gets tension headaches.  Saw Dr Corbett 1/31 for intermittent visual disturbances. Reviewed note. Concern for ? left ON given new L HVF defect compared to 2/2023, no RAPD, VA 20/25 OD and 20/40 OS. MR orbits w/w/o scheduled 2/8.   INTERIM HX 01/03/2024:  [This is a telehealth 2-way video visit] New symptom this morning- on left outer thigh, from hip down to knee. "Like electric shock pain". "very scary". It occurred twice since this AM. Standing up both times. Area over thigh tender to touch. It occurred once on the right, same distribution, no soreness on the right. No rash/skin changes. no numbness/.  Emgality is a "game changer". no migraines in a while.  Vertigo better, on and off.  Doing meditation.   INTERIM HX 11/07/2023: [This is a telehealth 2-way video visit]. Experiencing frequent headaches. Almost daily. Treated with medrol dose pack the end of Oct. While on steroids nurtec worked better, but never got 100% better. After completing steroids, nurtec stopped working. This past weekend, was in bed all day because of headache. "Extreme pain". Needs to stay in dark room.  INTERIM HX 10/19/2023: Could not tolerated Aubagio, GI s/e. Med dc'd. now s/p ocrevus loading doses 9/2023. Felt better/stronger after infusions.  Getting migraine headaches daily. MAxalt not helping.  Pt been experiencing episodic vertigo, meclizine helps. Needs to start vestibular therapy. MRI brain, C and T spine w/w/o 8/22/2023 (compared w/ 2/2023)- brain MRI is stable. MRI C spine with right C7 cord lesion (not reported on prior MRI but on my review was present back in 2/2023 as well). MRI T spine with stable T2 cord lesion. Overall MRIs stable.  INTERIM HX 07/24/2023: Pt could not tolerate DMF- cramps and severe diarrhea. Med dc'd. Switched to Vumerity- started 1 week ago, started 2 cap BID yesterday, started to get abdominal cramps this AM, did not take AM dose today. Still having aches in stomach. No falls. Got walker for balance.   INTERIM HX 05/24/2023: Doing well from MS stand point, no new symptoms. In better spirits, more energy. On DMF, on maintenance dose now, experienced some GI symptoms, now improving. Main concern has been migraine headaches now, 2 weeks of daily migraines, sumatriptan not helping.   INTERIM HX 04/26/2023: "I am upset and nervous". Reports she had new symptoms- woke up and could not move one day, "like rocks on me", "a fatigue I have never experienced". This lasted for a few days, stayed in PJs, felt better this morning. "My voice changes", "I get hoarse". Had another visual "aura" symptom, took sumatriptan it helped the headache, but make her tired. 2 falls, no injuries. Pt tearful and anxious during visit.  INTERIM HX 04/12/2023:  Spinal tap 3/28- [place] and matched OCB (> 5 unique), 2 TNC, Lyme neg, ACE neg, VDRL neg, WNV neg. P38, G60. IgG index 0.8, MBP 5.2.  MS mimicker labs in serum, including NMO and MOG ab negative.  Vitamin D 55.6. B12 > 2000.  Had post spinal headaches x 3 days, self resolved.  no more visual episodes/ Extreme fatigue. Tension headaches 3-4x/month, does not recall last migraine HA. Financial stress and worry. Tremors of hands.  Also lived a very busy life, may have brushed off symptoms. When she first started her job after college, she would notice RLE numbness, attributed this to sitting long periods of time.  Cannot remember things, word finding. A Pediatrician had recommended she be watched for "MS". ---------------------------------- HPI (initial visit Mar 15, 2023)- SAMUEL LIM is a 52 year old woman w/ hx of bipolar disorder, depression, hypothyroidism, fibromyalgia, migraine headaches, referred by neurologist, Dr. Melvin Guo, to rule out MS.   Of note, she was seen by Dr. oCrbett (2/1/2023) for episodic vision loss OS. First episode was 5/2022, lost complete vision in left eye, "like a curtain", it lasted seconds but vision still remained blurry (both eyes) for another 30 min and also complained of pain with eye movements of left eye. Saw an Optometrist, thought it was related to contact lenses, given new contacts. Occurs 2-3 times/month (duration of episodes varies). No clear triggers. WIth last 2 episodes had double vision, never covered on eye to see if it improved. Neuro ophtho exam was normal and Dr. Corbett suspect aura migraines, given childhood hx of migraines. Recommended neurology evaluation. Has had carotid duplex which was reportedly normal.  She keeps a note of all her symptoms. She has had multiple symptoms over the years. "I have had things over my life that have never been put together". "Extreme fatigue". "severe memory fog". Word finding difficulty- ~ 2020. Chest tightness. Chronic bowel issues- constipation, seeing GI recently and had colonoscopy, on MiraLAX, it is working. Mother has always told her she walks funny and slurs her words.  notices her balance is off, improving with PT. Looses her balance if she squats, this started about 7 years ago. She was diagnosed with fibromyalgia, knee pains/shoulder pains/ankle pain, seen rheumatologist, Isolated AMARI elevation, "flare ups now and then". Several incidents of vertigo, lasting 3-5 min, now resolved. Stiffness in neck/shoulders and other joints. She gets tension headaches. Urinary urgency and hesitancy. Stable MS.   Recently seen by Dr Melvin Guo (neurologist), had MRI brain and spine at [facility] 3T MRI (pt brings reports and disc). I personally reviewed images.  MRI brain w/o con 2/13/2023- scattered supratentorial WM lesions, more in R cerebral hemisphere, some punctuate and others ovoid appearing and periventricular (abutting Lat ventricle). No definite CC or infratentorial lesions. no enhancing lesions. The larger WM lesions have corresponding T1 black holes on T1 sequence.  MRI brain and orbits w/w/o 3/2/2023- Stable brain MRI. Normal orbits.  MRI C w/w/o 2/23/2023- no cervical cord lesion. Mild posterior disc bulging at several levels with mild thecal sac compression and b/l NF narrowing at C4-C5. MRI T spine w/w/o 2/24/2023- abnormal signal at anterior left T2 cord without enh. Cord atrophy in this region.   She got  in July 2022. Diagnosed with Bipolar disorder 12 years ago (sees a psychopharmacologist), reports mood is well controlled. She was a  for 27 years, stopped working in 2021 due to brain fog.

## 2024-02-13 NOTE — ASSESSMENT
[FreeTextEntry1] : Assessment/Plan: 53 year old female referred by Dr Melvin Guo for consultation on possible MS. She has had a long standing hx of non specific symptoms, including imbalance, slurred speech, vertigo, brain fog with word finding difficulty, generalized joint pains and extreme fatigue, which have all led her to quit her job in 2021. Since May 2022, she has been experiencing intermittent transient monocular/binocular visual disturbances (vision loss/blurry vision/diplopia) with normal neuro ophthalmological exam by Dr Corbett 2/2023. She recently had MRI imaging of brain and spine which showed lesions on brain MRI and T spine MRI concerning for possible demyelinating disease with neurological exam also significant for upper motor neuron signs and spinal tap shows both unique and matched OCB.   Her clinical history is non specific, no clear hx of "relapses", and a lot of her symptoms could be attributed to other conditions; for example the episodic visual disturbances could be aura migraines, the extreme fatigue/brain fog/cognitive issues could be related to mood disorder/bipolar disorder +/- on going psychosocial stressors (psychosomatic) , however at the same time given her objective findings on neurological exam (increased muscle tone in LLE, sustained clonus RLE) cannot definitely rule out a clinical CNS demyelinating event that occurred in the past which she either cannot recall or may have brushed off.   At this time, with her MRI, CSF results, clinical hx and neurological exam I do favor a diagnosis of CNS demyelinating disease, most fitting with Multiple sclerosis.    # Multiple Sclerosis (dx'd 4/2023), on Ocrevus since 8/2023- clinically and radiologically stable. New L HVF defect noted on neuro ophthalmology visit 1/31/2024 compared to 2/2023. I suspect this likely occurred sometime in the last year. No rAPD on exam and no worsening vision. Would hold off on steroid treatment at this time, unless MR orbits shows definite optic nerve enhancement. # Migraine headaches- Better # Episodic vertigo - most likely peripheral vestibular disorder. Better # New onset b/l lateral thigh (L>R) pain- meralgia paresthetica vs lumbar radiculopathy    Plan:- 1. Diagnostic Plan/Imaging: Plan to repeat MRI brain and C/T spine w/o contrast 1/2025 for radiological stability. Will follow up on MR orbits.    2. Disease Modifying therapy plan: Continue Ocrelizumab 600 mg q6 monthly infusion. Ocrelizumab labs every 6 months (CBC with diff, LFT, BUN, Creatinine)    3. Symptomatic therapy plan: () Fatigue: Discussed non pharmacological measures for addressing fatigue. Recommend focusing on mental health, sleep, diet and exercise. Continue modafinil 200 mg BID - insurance denied renewal.  () Spasticity: Stretching exercises () Neuropathic pain: On gabapentin 300 mg BID.  () Bladder Dysfunction: Referred to urology for neurogenic bladder () Gait: Walker. Consider Ampyra in the future. () Depression/Anxiety- F/u with psychiatrist (Dr. Israel). Recommend CBT () Vitamin D3 and B12 supplementations   4. Migraine headaches w/ auras:- better controlled on Emgality. [] Continue Nurtec 75 mg QD PRN for headache rescue therapy. Reviewed side effects. (Failed Maxalt and sumatriptan) [] Continue emgality as preventative therapy. s/e reviewed. [] Over the counter preventative therapies discussed, which include Magnesium 400 mg QD (discussed potential side effect of diarrhea), riboflavin 400 mg QD and Coenzyme Q10 100 mg daily.  5. Episodic vertigo- Referred to vestibular therapy. Meclizine PRN. Comes/goes, improved.  6. b/l neuropathic pain over lateral thigh (L>R)- meralgia paresthetica vs lumbar radiculopathy (intermittent, stable- less often) Recommend rest, anti-inflammatory medications, massage therapy, heating pads, and avoiding activity that strains back. Ibuprofen as needed for pain. Lidocaine patches as needed. Will refer to PT If symptoms worsen or do not improve with PT, will order MRI L spine.  Return to clinic 4 months  Lashonda Alvarado M.D.

## 2024-02-13 NOTE — PHYSICAL EXAM
[FreeTextEntry1] : PHYSICAL EXAM Constitutional: Alert, no acute distress Psychiatric: appropriate affect and mood Pulmonary: No respiratory distress, stable on room air   NEUROLOGICAL EXAM Mental status: The patient is alert and attentive, conversational memory intact Speech/language: No dysarthria. Slow speech.  Cranial nerves: CN II: Visual fields are full to confrontation. Pupil size equal and briskly reactive to light. No RAPD CN III, IV, VI: EOMI, b/l end gaze nystagmus, no ptosis CN V: Reduced sensations to PP over right half of face with midline splitting CN VII: Face is symmetric with normal eye closure and smile. CN VII: Hearing is normal to rubbing fingers CN IX, X: Palate elevates symmetrically. Phonation is normal. CN XI: Head turning and shoulder shrug are intact CN XII: Tongue is midline with normal movements and no atrophy. Motor: Strength is full bilaterally. 5/5 muscle power in bilateral UE and LE. Some give way weakness at proximal LE, at least 4/5 b/l HF. Muscle tone increased in LLE. Reflexes: Diffusely hyperreflexic, b/l yeboah, b/l clonus. No Babinski Sensory: Reduced sensation to PP over RUE and LLE (midline splitting over chest). Vibration sensation stronger over RUE (25 s) and RLE (10 s) compared to LUE (21s) and LLE (8s) respectively. Coordination: No dysmetria on FNF Gait/Stance: Narrow based spastic gait. Slow and cautious, stiff posture. Mild L steppage gait. Cannot tandem gait. Romberg positive.

## 2024-03-04 RX ORDER — GALCANEZUMAB 120 MG/ML
120 INJECTION, SOLUTION SUBCUTANEOUS
Qty: 1 | Refills: 11 | Status: ACTIVE | COMMUNITY
Start: 2023-11-07 | End: 1900-01-01

## 2024-03-13 ENCOUNTER — NON-APPOINTMENT (OUTPATIENT)
Age: 54
End: 2024-03-13

## 2024-03-15 RX ORDER — OCRELIZUMAB 300 MG/10ML
300 INJECTION INTRAVENOUS DAILY
Qty: 2 | Refills: 2 | Status: ACTIVE | OUTPATIENT
Start: 2024-03-08

## 2024-03-25 ENCOUNTER — APPOINTMENT (OUTPATIENT)
Dept: NEUROLOGY | Facility: CLINIC | Age: 54
End: 2024-03-25
Payer: MEDICARE

## 2024-03-25 VITALS — HEART RATE: 72 BPM | DIASTOLIC BLOOD PRESSURE: 64 MMHG | SYSTOLIC BLOOD PRESSURE: 101 MMHG | RESPIRATION RATE: 18 BRPM

## 2024-03-25 VITALS — HEART RATE: 74 BPM | RESPIRATION RATE: 18 BRPM | SYSTOLIC BLOOD PRESSURE: 100 MMHG | DIASTOLIC BLOOD PRESSURE: 62 MMHG

## 2024-03-25 VITALS — DIASTOLIC BLOOD PRESSURE: 62 MMHG | HEART RATE: 79 BPM | SYSTOLIC BLOOD PRESSURE: 99 MMHG | RESPIRATION RATE: 18 BRPM

## 2024-03-25 VITALS — HEART RATE: 72 BPM | SYSTOLIC BLOOD PRESSURE: 100 MMHG | RESPIRATION RATE: 18 BRPM | DIASTOLIC BLOOD PRESSURE: 62 MMHG

## 2024-03-25 VITALS — SYSTOLIC BLOOD PRESSURE: 104 MMHG | HEART RATE: 72 BPM | RESPIRATION RATE: 18 BRPM | DIASTOLIC BLOOD PRESSURE: 68 MMHG

## 2024-03-25 VITALS — SYSTOLIC BLOOD PRESSURE: 113 MMHG | HEART RATE: 71 BPM | DIASTOLIC BLOOD PRESSURE: 66 MMHG | RESPIRATION RATE: 18 BRPM

## 2024-03-25 VITALS — DIASTOLIC BLOOD PRESSURE: 61 MMHG | HEART RATE: 73 BPM | RESPIRATION RATE: 18 BRPM | SYSTOLIC BLOOD PRESSURE: 102 MMHG

## 2024-03-25 VITALS — SYSTOLIC BLOOD PRESSURE: 100 MMHG | HEART RATE: 74 BPM | DIASTOLIC BLOOD PRESSURE: 62 MMHG | RESPIRATION RATE: 18 BRPM

## 2024-03-25 VITALS — DIASTOLIC BLOOD PRESSURE: 64 MMHG | SYSTOLIC BLOOD PRESSURE: 113 MMHG | RESPIRATION RATE: 18 BRPM | HEART RATE: 78 BPM

## 2024-03-25 PROCEDURE — 96413 CHEMO IV INFUSION 1 HR: CPT

## 2024-03-25 PROCEDURE — 96415 CHEMO IV INFUSION ADDL HR: CPT

## 2024-03-25 NOTE — HISTORY OF PRESENT ILLNESS
[6] : 6 [Denies] : Denies [No] : No [Yes] : Yes [22g] : 22g [Right upper extremity] : Right upper extremity [Start Time: ___] : Start Time: [unfilled] [Medication Name: ___] : Medication Name: [unfilled] [End Time: ___] : Medication End Time: [unfilled] [Total Amount Administered: ___] : Total Amount Administered: [unfilled] [IV discontinued. Intact. No signs or symptoms of IV complications noted. Time: ___] : IV discontinued. Intact. No signs or symptoms of IV complications noted. Time: [unfilled] [Patient  instructed to seek medical attention with signs and symptoms of adverse effects] : Patient  instructed to seek medical attention with signs and symptoms of adverse effects [Patient left unit in no acute distress] : Patient left unit in no acute distress [Medications administered as ordered and tolerated well.] : Medications administered as ordered and tolerated well. [de-identified] : right shoulder pain [de-identified] : wrist [de-identified] : 9:49am [de-identified] : Patient observed for one hour post infusion. No reaction.  [de-identified] : Generic: ocrelizumab Dose: 600mg Infusion rate:                       40_ml/hr for 30_mins                       80_ml/hr for 30_mins                       120_ml/hr for 30_mins                       160_ml/hr for 30_ mins                       200_ml/hr for 30_ mins                       240_ml/hr for 30_ mins                       280_ml/hr for 30_ mins NDC#: 89503-360-70 Lot#: [id number] Exp: 11/2025 Was this medication buy and bill? No Specialty Pharmacy: [facility] Dx: Multiple Sclerosis MD Prescriber: Dr. Alvarado  CPT: 71931, [id number] Pre-medication Acetaminophen: 650mg Route: PO Famotidine 20mg Route: IVP NDC#: 45416-423-08 Lot #: [id number] Exp: 02/2025 Diphenhydramine: 50mg Route: PO Methylprednisolone: 125mg Route: IVP NDC#: 7563-3415-53 Lot#: [id number] Exp: 2026-07  Next Appointment: LEILA Allen tasked to schedule next appt.

## 2024-04-12 ENCOUNTER — RX RENEWAL (OUTPATIENT)
Age: 54
End: 2024-04-12

## 2024-05-20 ENCOUNTER — RX RENEWAL (OUTPATIENT)
Age: 54
End: 2024-05-20

## 2024-05-20 RX ORDER — MECLIZINE HYDROCHLORIDE 25 MG/1
25 TABLET ORAL
Qty: 90 | Refills: 0 | Status: ACTIVE | COMMUNITY
Start: 2023-09-11 | End: 1900-01-01

## 2024-06-26 ENCOUNTER — APPOINTMENT (OUTPATIENT)
Dept: NEUROLOGY | Facility: CLINIC | Age: 54
End: 2024-06-26

## 2024-07-03 ENCOUNTER — APPOINTMENT (OUTPATIENT)
Dept: MRI IMAGING | Facility: CLINIC | Age: 54
End: 2024-07-03
Payer: MEDICARE

## 2024-07-03 ENCOUNTER — OUTPATIENT (OUTPATIENT)
Dept: OUTPATIENT SERVICES | Facility: HOSPITAL | Age: 54
LOS: 1 days | End: 2024-07-03
Payer: MEDICARE

## 2024-07-03 DIAGNOSIS — G35 MULTIPLE SCLEROSIS: ICD-10-CM

## 2024-07-03 PROCEDURE — 70553 MRI BRAIN STEM W/O & W/DYE: CPT | Mod: MH

## 2024-07-03 PROCEDURE — A9585: CPT

## 2024-07-03 PROCEDURE — 70543 MRI ORBT/FAC/NCK W/O &W/DYE: CPT | Mod: 26,MH

## 2024-07-03 PROCEDURE — 70553 MRI BRAIN STEM W/O & W/DYE: CPT | Mod: 26,MH

## 2024-07-03 PROCEDURE — 70543 MRI ORBT/FAC/NCK W/O &W/DYE: CPT | Mod: MH

## 2024-07-05 ENCOUNTER — OUTPATIENT (OUTPATIENT)
Dept: OUTPATIENT SERVICES | Facility: HOSPITAL | Age: 54
LOS: 1 days | End: 2024-07-05
Payer: MEDICARE

## 2024-07-05 ENCOUNTER — APPOINTMENT (OUTPATIENT)
Dept: MRI IMAGING | Facility: CLINIC | Age: 54
End: 2024-07-05
Payer: MEDICARE

## 2024-07-05 DIAGNOSIS — G35 MULTIPLE SCLEROSIS: ICD-10-CM

## 2024-07-05 PROCEDURE — 72156 MRI NECK SPINE W/O & W/DYE: CPT | Mod: 26,MH

## 2024-07-05 PROCEDURE — 72157 MRI CHEST SPINE W/O & W/DYE: CPT | Mod: 26,MH

## 2024-07-05 PROCEDURE — 72156 MRI NECK SPINE W/O & W/DYE: CPT | Mod: MH

## 2024-07-05 PROCEDURE — 72157 MRI CHEST SPINE W/O & W/DYE: CPT | Mod: MH

## 2024-07-05 PROCEDURE — A9585: CPT

## 2024-09-18 ENCOUNTER — APPOINTMENT (OUTPATIENT)
Dept: MAMMOGRAPHY | Facility: CLINIC | Age: 54
End: 2024-09-18
Payer: MEDICARE

## 2024-09-18 ENCOUNTER — APPOINTMENT (OUTPATIENT)
Dept: ULTRASOUND IMAGING | Facility: CLINIC | Age: 54
End: 2024-09-18
Payer: MEDICARE

## 2024-09-18 PROCEDURE — 77063 BREAST TOMOSYNTHESIS BI: CPT | Mod: 26

## 2024-09-18 PROCEDURE — 76641 ULTRASOUND BREAST COMPLETE: CPT | Mod: 26,50,GY

## 2024-09-18 PROCEDURE — 77067 SCR MAMMO BI INCL CAD: CPT | Mod: 26

## 2024-09-27 ENCOUNTER — APPOINTMENT (OUTPATIENT)
Dept: NEUROLOGY | Facility: CLINIC | Age: 54
End: 2024-09-27

## 2024-10-23 ENCOUNTER — OFFICE (OUTPATIENT)
Dept: URBAN - METROPOLITAN AREA CLINIC 115 | Facility: CLINIC | Age: 54
Setting detail: OPHTHALMOLOGY
End: 2024-10-23
Payer: MEDICARE

## 2024-10-23 DIAGNOSIS — H35.372: ICD-10-CM

## 2024-10-23 DIAGNOSIS — H50.22: ICD-10-CM

## 2024-10-23 DIAGNOSIS — H40.013: ICD-10-CM

## 2024-10-23 DIAGNOSIS — H53.2: ICD-10-CM

## 2024-10-23 DIAGNOSIS — H25.13: ICD-10-CM

## 2024-10-23 DIAGNOSIS — H50.10: ICD-10-CM

## 2024-10-23 DIAGNOSIS — H01.004: ICD-10-CM

## 2024-10-23 DIAGNOSIS — F34.1: ICD-10-CM

## 2024-10-23 DIAGNOSIS — G43.009: ICD-10-CM

## 2024-10-23 DIAGNOSIS — H53.433: ICD-10-CM

## 2024-10-23 DIAGNOSIS — H01.001: ICD-10-CM

## 2024-10-23 PROBLEM — H46.2 OPTIC NEUROPATHY: Status: ACTIVE | Noted: 2024-10-23

## 2024-10-23 PROCEDURE — 92083 EXTENDED VISUAL FIELD XM: CPT | Performed by: OPHTHALMOLOGY

## 2024-10-23 PROCEDURE — 92014 COMPRE OPH EXAM EST PT 1/>: CPT | Performed by: OPHTHALMOLOGY

## 2024-10-23 PROCEDURE — 92060 SENSORIMOTOR EXAMINATION: CPT | Performed by: OPHTHALMOLOGY

## 2024-10-23 PROCEDURE — 92133 CPTRZD OPH DX IMG PST SGM ON: CPT | Performed by: OPHTHALMOLOGY

## 2024-10-23 ASSESSMENT — REFRACTION_CURRENTRX
OD_ADD: +1.50
OS_OVR_VA: 20/
OS_ADD: +1.25
OD_AXIS: 094
OS_CYLINDER: +0.50
OS_OVR_VA: 20/
OD_CYLINDER: +0.50
OS_SPHERE: -5.25
OS_AXIS: 063
OD_OVR_VA: 20/
OD_OVR_VA: 20/
OS_AXIS: 160
OS_ADD: +1.50
OD_ADD: +1.50
OD_CYLINDER: -0.50
OD_SPHERE: -5.25
OD_AXIS: 011
OS_SPHERE: -5.75
OS_VPRISM_DIRECTION: BF
OS_CYLINDER: -0.50
OD_SPHERE: -5.50
OD_VPRISM_DIRECTION: BF

## 2024-10-23 ASSESSMENT — REFRACTION_AUTOREFRACTION
OD_CYLINDER: -0.50
OD_AXIS: 087
OD_SPHERE: -5.00
OS_SPHERE: -5.75
OS_AXIS: 091
OS_CYLINDER: -1.50

## 2024-10-23 ASSESSMENT — PACHYMETRY
OS_CT_UM: 622
OS_CT_CORRECTION: -6
OD_CT_CORRECTION: -4
OD_CT_UM: 604

## 2024-10-23 ASSESSMENT — REFRACTION_MANIFEST
OS_VA1: 20/25+
OS_AXIS: 090
OD_CYLINDER: -0.50
OD_SPHERE: -5.25
OD_VA1: 20/25
OS_CYLINDER: -1.50
OD_AXIS: 085
OS_SPHERE: -5.75

## 2024-10-23 ASSESSMENT — VISUAL ACUITY
OD_BCVA: 20/25-1
OS_BCVA: 20/25-1

## 2024-10-23 ASSESSMENT — LID EXAM ASSESSMENTS
OD_BLEPHARITIS: RUL
OS_BLEPHARITIS: LUL

## 2024-10-23 ASSESSMENT — CONFRONTATIONAL VISUAL FIELD TEST (CVF)
OS_FINDINGS: FULL
OD_FINDINGS: FULL

## 2024-10-23 ASSESSMENT — TONOMETRY: OD_IOP_MMHG: 21

## 2024-12-22 ENCOUNTER — NON-APPOINTMENT (OUTPATIENT)
Age: 54
End: 2024-12-22

## 2025-02-20 ENCOUNTER — NON-APPOINTMENT (OUTPATIENT)
Age: 55
End: 2025-02-20

## 2025-06-10 ENCOUNTER — NON-APPOINTMENT (OUTPATIENT)
Age: 55
End: 2025-06-10

## 2025-07-22 ENCOUNTER — NON-APPOINTMENT (OUTPATIENT)
Age: 55
End: 2025-07-22

## 2025-08-28 ENCOUNTER — NON-APPOINTMENT (OUTPATIENT)
Age: 55
End: 2025-08-28